# Patient Record
Sex: FEMALE | HISPANIC OR LATINO | Employment: UNEMPLOYED | URBAN - METROPOLITAN AREA
[De-identification: names, ages, dates, MRNs, and addresses within clinical notes are randomized per-mention and may not be internally consistent; named-entity substitution may affect disease eponyms.]

---

## 2021-01-28 ENCOUNTER — HOSPITAL ENCOUNTER (OUTPATIENT)
Dept: RADIOLOGY | Facility: MEDICAL CENTER | Age: 56
End: 2021-01-28
Attending: NURSE PRACTITIONER

## 2021-01-28 DIAGNOSIS — R74.8 ELEVATED LIVER ENZYMES: ICD-10-CM

## 2021-02-28 ENCOUNTER — APPOINTMENT (OUTPATIENT)
Dept: RADIOLOGY | Facility: MEDICAL CENTER | Age: 56
DRG: 417 | End: 2021-02-28
Attending: EMERGENCY MEDICINE
Payer: COMMERCIAL

## 2021-02-28 ENCOUNTER — HOSPITAL ENCOUNTER (INPATIENT)
Facility: MEDICAL CENTER | Age: 56
LOS: 4 days | DRG: 417 | End: 2021-03-04
Attending: EMERGENCY MEDICINE | Admitting: INTERNAL MEDICINE
Payer: COMMERCIAL

## 2021-02-28 DIAGNOSIS — K85.10 ACUTE GALLSTONE PANCREATITIS: ICD-10-CM

## 2021-02-28 DIAGNOSIS — K80.43: ICD-10-CM

## 2021-02-28 DIAGNOSIS — K81.9 CHOLECYSTITIS: ICD-10-CM

## 2021-02-28 DIAGNOSIS — K85.90 ACUTE PANCREATITIS, UNSPECIFIED COMPLICATION STATUS, UNSPECIFIED PANCREATITIS TYPE: ICD-10-CM

## 2021-02-28 PROBLEM — R10.9 AP (ABDOMINAL PAIN): Status: ACTIVE | Noted: 2021-02-28

## 2021-02-28 LAB
ALBUMIN SERPL BCP-MCNC: 4.1 G/DL (ref 3.2–4.9)
ALBUMIN/GLOB SERPL: 1 G/DL
ALP SERPL-CCNC: 339 U/L (ref 30–99)
ALT SERPL-CCNC: 214 U/L (ref 2–50)
ANION GAP SERPL CALC-SCNC: 14 MMOL/L (ref 7–16)
APPEARANCE UR: CLEAR
AST SERPL-CCNC: 418 U/L (ref 12–45)
BASOPHILS # BLD AUTO: 0.5 % (ref 0–1.8)
BASOPHILS # BLD: 0.03 K/UL (ref 0–0.12)
BILIRUB SERPL-MCNC: 2.3 MG/DL (ref 0.1–1.5)
BILIRUB UR QL STRIP.AUTO: NEGATIVE
BUN SERPL-MCNC: 11 MG/DL (ref 8–22)
CALCIUM SERPL-MCNC: 9.9 MG/DL (ref 8.5–10.5)
CHLORIDE SERPL-SCNC: 104 MMOL/L (ref 96–112)
CO2 SERPL-SCNC: 20 MMOL/L (ref 20–33)
COLOR UR: YELLOW
CREAT SERPL-MCNC: 0.46 MG/DL (ref 0.5–1.4)
EOSINOPHIL # BLD AUTO: 0.02 K/UL (ref 0–0.51)
EOSINOPHIL NFR BLD: 0.3 % (ref 0–6.9)
ERYTHROCYTE [DISTWIDTH] IN BLOOD BY AUTOMATED COUNT: 48.2 FL (ref 35.9–50)
GLOBULIN SER CALC-MCNC: 4.1 G/DL (ref 1.9–3.5)
GLUCOSE SERPL-MCNC: 99 MG/DL (ref 65–99)
GLUCOSE UR STRIP.AUTO-MCNC: NEGATIVE MG/DL
HCG SERPL QL: NEGATIVE
HCT VFR BLD AUTO: 43.6 % (ref 37–47)
HGB BLD-MCNC: 13.9 G/DL (ref 12–16)
IMM GRANULOCYTES # BLD AUTO: 0.01 K/UL (ref 0–0.11)
IMM GRANULOCYTES NFR BLD AUTO: 0.2 % (ref 0–0.9)
KETONES UR STRIP.AUTO-MCNC: ABNORMAL MG/DL
LEUKOCYTE ESTERASE UR QL STRIP.AUTO: NEGATIVE
LIPASE SERPL-CCNC: 399 U/L (ref 11–82)
LYMPHOCYTES # BLD AUTO: 1.64 K/UL (ref 1–4.8)
LYMPHOCYTES NFR BLD: 27.7 % (ref 22–41)
MCH RBC QN AUTO: 29.4 PG (ref 27–33)
MCHC RBC AUTO-ENTMCNC: 31.9 G/DL (ref 33.6–35)
MCV RBC AUTO: 92.4 FL (ref 81.4–97.8)
MICRO URNS: ABNORMAL
MONOCYTES # BLD AUTO: 0.36 K/UL (ref 0–0.85)
MONOCYTES NFR BLD AUTO: 6.1 % (ref 0–13.4)
NEUTROPHILS # BLD AUTO: 3.86 K/UL (ref 2–7.15)
NEUTROPHILS NFR BLD: 65.2 % (ref 44–72)
NITRITE UR QL STRIP.AUTO: NEGATIVE
NRBC # BLD AUTO: 0 K/UL
NRBC BLD-RTO: 0 /100 WBC
PH UR STRIP.AUTO: 6 [PH] (ref 5–8)
PLATELET # BLD AUTO: 218 K/UL (ref 164–446)
PMV BLD AUTO: 9.4 FL (ref 9–12.9)
POTASSIUM SERPL-SCNC: 4.3 MMOL/L (ref 3.6–5.5)
PROT SERPL-MCNC: 8.2 G/DL (ref 6–8.2)
PROT UR QL STRIP: NEGATIVE MG/DL
RBC # BLD AUTO: 4.72 M/UL (ref 4.2–5.4)
RBC UR QL AUTO: NEGATIVE
SARS-COV+SARS-COV-2 AG RESP QL IA.RAPID: NOTDETECTED
SODIUM SERPL-SCNC: 138 MMOL/L (ref 135–145)
SP GR UR STRIP.AUTO: 1.01
SPECIMEN SOURCE: NORMAL
UROBILINOGEN UR STRIP.AUTO-MCNC: 0.2 MG/DL
WBC # BLD AUTO: 5.9 K/UL (ref 4.8–10.8)

## 2021-02-28 PROCEDURE — 85025 COMPLETE CBC W/AUTO DIFF WBC: CPT

## 2021-02-28 PROCEDURE — 700101 HCHG RX REV CODE 250: Performed by: EMERGENCY MEDICINE

## 2021-02-28 PROCEDURE — 700102 HCHG RX REV CODE 250 W/ 637 OVERRIDE(OP): Performed by: EMERGENCY MEDICINE

## 2021-02-28 PROCEDURE — 83690 ASSAY OF LIPASE: CPT

## 2021-02-28 PROCEDURE — 76705 ECHO EXAM OF ABDOMEN: CPT

## 2021-02-28 PROCEDURE — 96365 THER/PROPH/DIAG IV INF INIT: CPT

## 2021-02-28 PROCEDURE — 96368 THER/DIAG CONCURRENT INF: CPT

## 2021-02-28 PROCEDURE — 700105 HCHG RX REV CODE 258: Performed by: EMERGENCY MEDICINE

## 2021-02-28 PROCEDURE — 80053 COMPREHEN METABOLIC PANEL: CPT

## 2021-02-28 PROCEDURE — 81003 URINALYSIS AUTO W/O SCOPE: CPT

## 2021-02-28 PROCEDURE — 700105 HCHG RX REV CODE 258: Performed by: INTERNAL MEDICINE

## 2021-02-28 PROCEDURE — 87426 SARSCOV CORONAVIRUS AG IA: CPT

## 2021-02-28 PROCEDURE — A9270 NON-COVERED ITEM OR SERVICE: HCPCS | Performed by: EMERGENCY MEDICINE

## 2021-02-28 PROCEDURE — 770006 HCHG ROOM/CARE - MED/SURG/GYN SEMI*

## 2021-02-28 PROCEDURE — C9803 HOPD COVID-19 SPEC COLLECT: HCPCS | Performed by: EMERGENCY MEDICINE

## 2021-02-28 PROCEDURE — 700111 HCHG RX REV CODE 636 W/ 250 OVERRIDE (IP): Performed by: EMERGENCY MEDICINE

## 2021-02-28 PROCEDURE — 36415 COLL VENOUS BLD VENIPUNCTURE: CPT

## 2021-02-28 PROCEDURE — 99222 1ST HOSP IP/OBS MODERATE 55: CPT | Performed by: INTERNAL MEDICINE

## 2021-02-28 PROCEDURE — 99285 EMERGENCY DEPT VISIT HI MDM: CPT

## 2021-02-28 PROCEDURE — 84703 CHORIONIC GONADOTROPIN ASSAY: CPT

## 2021-02-28 RX ORDER — HEPARIN SODIUM 5000 [USP'U]/ML
5000 INJECTION, SOLUTION INTRAVENOUS; SUBCUTANEOUS EVERY 8 HOURS
Status: DISCONTINUED | OUTPATIENT
Start: 2021-02-28 | End: 2021-03-04 | Stop reason: HOSPADM

## 2021-02-28 RX ORDER — AMOXICILLIN 250 MG
2 CAPSULE ORAL 2 TIMES DAILY
Status: DISCONTINUED | OUTPATIENT
Start: 2021-02-28 | End: 2021-03-04

## 2021-02-28 RX ORDER — HYDROCODONE BITARTRATE AND ACETAMINOPHEN 5; 325 MG/1; MG/1
1 TABLET ORAL ONCE
Status: COMPLETED | OUTPATIENT
Start: 2021-02-28 | End: 2021-02-28

## 2021-02-28 RX ORDER — SODIUM CHLORIDE 9 MG/ML
INJECTION, SOLUTION INTRAVENOUS CONTINUOUS
Status: DISCONTINUED | OUTPATIENT
Start: 2021-02-28 | End: 2021-03-04 | Stop reason: HOSPADM

## 2021-02-28 RX ORDER — OXYCODONE HYDROCHLORIDE 5 MG/1
2.5 TABLET ORAL
Status: DISCONTINUED | OUTPATIENT
Start: 2021-02-28 | End: 2021-03-04

## 2021-02-28 RX ORDER — LABETALOL HYDROCHLORIDE 5 MG/ML
10 INJECTION, SOLUTION INTRAVENOUS EVERY 4 HOURS PRN
Status: DISCONTINUED | OUTPATIENT
Start: 2021-02-28 | End: 2021-03-04 | Stop reason: HOSPADM

## 2021-02-28 RX ORDER — ONDANSETRON 4 MG/1
4 TABLET, ORALLY DISINTEGRATING ORAL ONCE
Status: COMPLETED | OUTPATIENT
Start: 2021-02-28 | End: 2021-02-28

## 2021-02-28 RX ORDER — ACETAMINOPHEN 325 MG/1
650 TABLET ORAL EVERY 6 HOURS PRN
Status: DISCONTINUED | OUTPATIENT
Start: 2021-02-28 | End: 2021-03-04 | Stop reason: HOSPADM

## 2021-02-28 RX ORDER — OXYCODONE HYDROCHLORIDE 5 MG/1
5 TABLET ORAL
Status: DISCONTINUED | OUTPATIENT
Start: 2021-02-28 | End: 2021-03-04

## 2021-02-28 RX ORDER — HYDROMORPHONE HYDROCHLORIDE 1 MG/ML
0.25 INJECTION, SOLUTION INTRAMUSCULAR; INTRAVENOUS; SUBCUTANEOUS
Status: DISCONTINUED | OUTPATIENT
Start: 2021-02-28 | End: 2021-03-04

## 2021-02-28 RX ORDER — POLYETHYLENE GLYCOL 3350 17 G/17G
1 POWDER, FOR SOLUTION ORAL
Status: DISCONTINUED | OUTPATIENT
Start: 2021-02-28 | End: 2021-03-04

## 2021-02-28 RX ORDER — TRAZODONE HYDROCHLORIDE 50 MG/1
100 TABLET ORAL
Status: DISCONTINUED | OUTPATIENT
Start: 2021-02-28 | End: 2021-02-28

## 2021-02-28 RX ORDER — BISACODYL 10 MG
10 SUPPOSITORY, RECTAL RECTAL
Status: DISCONTINUED | OUTPATIENT
Start: 2021-02-28 | End: 2021-03-04

## 2021-02-28 RX ADMIN — ONDANSETRON 4 MG: 4 TABLET, ORALLY DISINTEGRATING ORAL at 17:58

## 2021-02-28 RX ADMIN — HYDROCODONE BITARTRATE AND ACETAMINOPHEN 1 TABLET: 5; 325 TABLET ORAL at 17:58

## 2021-02-28 RX ADMIN — SODIUM CHLORIDE: 9 INJECTION, SOLUTION INTRAVENOUS at 21:27

## 2021-02-28 RX ADMIN — CEFTRIAXONE SODIUM 1 G: 1 INJECTION, POWDER, FOR SOLUTION INTRAMUSCULAR; INTRAVENOUS at 19:45

## 2021-02-28 RX ADMIN — METRONIDAZOLE 500 MG: 500 INJECTION, SOLUTION INTRAVENOUS at 19:45

## 2021-02-28 ASSESSMENT — COGNITIVE AND FUNCTIONAL STATUS - GENERAL
SUGGESTED CMS G CODE MODIFIER DAILY ACTIVITY: CH
DAILY ACTIVITIY SCORE: 24
MOBILITY SCORE: 24
SUGGESTED CMS G CODE MODIFIER MOBILITY: CH

## 2021-02-28 ASSESSMENT — ENCOUNTER SYMPTOMS
HEADACHES: 0
VOMITING: 0
BLURRED VISION: 0
PALPITATIONS: 0
DOUBLE VISION: 0
HEARTBURN: 1
HEMOPTYSIS: 0
INSOMNIA: 0
COUGH: 0
FEVER: 0
STRIDOR: 0
NAUSEA: 1
BRUISES/BLEEDS EASILY: 0
MYALGIAS: 0
WEIGHT LOSS: 0
ABDOMINAL PAIN: 1
NECK PAIN: 0
SORE THROAT: 0
DEPRESSION: 0
DIZZINESS: 0

## 2021-02-28 ASSESSMENT — FIBROSIS 4 INDEX
FIB4 SCORE: 7.47
FIB4 SCORE: 7.47

## 2021-02-28 ASSESSMENT — PATIENT HEALTH QUESTIONNAIRE - PHQ9
1. LITTLE INTEREST OR PLEASURE IN DOING THINGS: NOT AT ALL
2. FEELING DOWN, DEPRESSED, IRRITABLE, OR HOPELESS: NOT AT ALL
SUM OF ALL RESPONSES TO PHQ9 QUESTIONS 1 AND 2: 0

## 2021-02-28 ASSESSMENT — LIFESTYLE VARIABLES
ALCOHOL_USE: NO
TOTAL SCORE: 0
ON A TYPICAL DAY WHEN YOU DRINK ALCOHOL HOW MANY DRINKS DO YOU HAVE: 0
EVER FELT BAD OR GUILTY ABOUT YOUR DRINKING: NO
HOW MANY TIMES IN THE PAST YEAR HAVE YOU HAD 5 OR MORE DRINKS IN A DAY: 0
EVER HAD A DRINK FIRST THING IN THE MORNING TO STEADY YOUR NERVES TO GET RID OF A HANGOVER: NO
CONSUMPTION TOTAL: NEGATIVE
TOTAL SCORE: 0
AVERAGE NUMBER OF DAYS PER WEEK YOU HAVE A DRINK CONTAINING ALCOHOL: 0
HAVE PEOPLE ANNOYED YOU BY CRITICIZING YOUR DRINKING: NO
DOES PATIENT WANT TO STOP DRINKING: NO
HAVE YOU EVER FELT YOU SHOULD CUT DOWN ON YOUR DRINKING: NO
TOTAL SCORE: 0

## 2021-03-01 ENCOUNTER — APPOINTMENT (OUTPATIENT)
Dept: RADIOLOGY | Facility: MEDICAL CENTER | Age: 56
DRG: 417 | End: 2021-03-01
Attending: INTERNAL MEDICINE
Payer: COMMERCIAL

## 2021-03-01 PROBLEM — K80.50 CHOLEDOCHOLITHIASIS: Status: ACTIVE | Noted: 2021-02-28

## 2021-03-01 PROBLEM — R74.01 TRANSAMINITIS: Status: ACTIVE | Noted: 2021-03-01

## 2021-03-01 PROBLEM — E87.6 HYPOKALEMIA: Status: ACTIVE | Noted: 2021-03-01

## 2021-03-01 PROBLEM — E66.01 CLASS 3 SEVERE OBESITY DUE TO EXCESS CALORIES WITH SERIOUS COMORBIDITY AND BODY MASS INDEX (BMI) OF 40.0 TO 44.9 IN ADULT (HCC): Status: ACTIVE | Noted: 2021-03-01

## 2021-03-01 PROBLEM — K85.10 ACUTE BILIARY PANCREATITIS WITHOUT INFECTION OR NECROSIS: Status: ACTIVE | Noted: 2021-02-28

## 2021-03-01 LAB
ALBUMIN SERPL BCP-MCNC: 3.3 G/DL (ref 3.2–4.9)
ALBUMIN/GLOB SERPL: 1 G/DL
ALP SERPL-CCNC: 342 U/L (ref 30–99)
ALT SERPL-CCNC: 200 U/L (ref 2–50)
ANION GAP SERPL CALC-SCNC: 11 MMOL/L (ref 7–16)
AST SERPL-CCNC: 290 U/L (ref 12–45)
BASOPHILS # BLD AUTO: 0.7 % (ref 0–1.8)
BASOPHILS # BLD: 0.03 K/UL (ref 0–0.12)
BILIRUB SERPL-MCNC: 2.7 MG/DL (ref 0.1–1.5)
BUN SERPL-MCNC: 9 MG/DL (ref 8–22)
CALCIUM SERPL-MCNC: 8.9 MG/DL (ref 8.5–10.5)
CHLORIDE SERPL-SCNC: 109 MMOL/L (ref 96–112)
CO2 SERPL-SCNC: 20 MMOL/L (ref 20–33)
CREAT SERPL-MCNC: 0.45 MG/DL (ref 0.5–1.4)
EOSINOPHIL # BLD AUTO: 0.06 K/UL (ref 0–0.51)
EOSINOPHIL NFR BLD: 1.3 % (ref 0–6.9)
ERYTHROCYTE [DISTWIDTH] IN BLOOD BY AUTOMATED COUNT: 49 FL (ref 35.9–50)
GLOBULIN SER CALC-MCNC: 3.4 G/DL (ref 1.9–3.5)
GLUCOSE SERPL-MCNC: 79 MG/DL (ref 65–99)
HCT VFR BLD AUTO: 42 % (ref 37–47)
HGB BLD-MCNC: 12.9 G/DL (ref 12–16)
IMM GRANULOCYTES # BLD AUTO: 0.01 K/UL (ref 0–0.11)
IMM GRANULOCYTES NFR BLD AUTO: 0.2 % (ref 0–0.9)
LIPASE SERPL-CCNC: 56 U/L (ref 11–82)
LYMPHOCYTES # BLD AUTO: 1.86 K/UL (ref 1–4.8)
LYMPHOCYTES NFR BLD: 41.3 % (ref 22–41)
MCH RBC QN AUTO: 28.9 PG (ref 27–33)
MCHC RBC AUTO-ENTMCNC: 30.7 G/DL (ref 33.6–35)
MCV RBC AUTO: 94 FL (ref 81.4–97.8)
MONOCYTES # BLD AUTO: 0.32 K/UL (ref 0–0.85)
MONOCYTES NFR BLD AUTO: 7.1 % (ref 0–13.4)
NEUTROPHILS # BLD AUTO: 2.22 K/UL (ref 2–7.15)
NEUTROPHILS NFR BLD: 49.4 % (ref 44–72)
NRBC # BLD AUTO: 0 K/UL
NRBC BLD-RTO: 0 /100 WBC
PLATELET # BLD AUTO: 205 K/UL (ref 164–446)
PMV BLD AUTO: 9.9 FL (ref 9–12.9)
POTASSIUM SERPL-SCNC: 3.5 MMOL/L (ref 3.6–5.5)
PROT SERPL-MCNC: 6.7 G/DL (ref 6–8.2)
RBC # BLD AUTO: 4.47 M/UL (ref 4.2–5.4)
SODIUM SERPL-SCNC: 140 MMOL/L (ref 135–145)
WBC # BLD AUTO: 4.5 K/UL (ref 4.8–10.8)

## 2021-03-01 PROCEDURE — 700105 HCHG RX REV CODE 258: Performed by: NURSE PRACTITIONER

## 2021-03-01 PROCEDURE — 700105 HCHG RX REV CODE 258: Performed by: INTERNAL MEDICINE

## 2021-03-01 PROCEDURE — 700111 HCHG RX REV CODE 636 W/ 250 OVERRIDE (IP): Performed by: INTERNAL MEDICINE

## 2021-03-01 PROCEDURE — 85025 COMPLETE CBC W/AUTO DIFF WBC: CPT

## 2021-03-01 PROCEDURE — 83690 ASSAY OF LIPASE: CPT

## 2021-03-01 PROCEDURE — 74181 MRI ABDOMEN W/O CONTRAST: CPT

## 2021-03-01 PROCEDURE — 99232 SBSQ HOSP IP/OBS MODERATE 35: CPT | Performed by: INTERNAL MEDICINE

## 2021-03-01 PROCEDURE — 94760 N-INVAS EAR/PLS OXIMETRY 1: CPT

## 2021-03-01 PROCEDURE — 80053 COMPREHEN METABOLIC PANEL: CPT

## 2021-03-01 PROCEDURE — 700101 HCHG RX REV CODE 250: Performed by: INTERNAL MEDICINE

## 2021-03-01 PROCEDURE — 770006 HCHG ROOM/CARE - MED/SURG/GYN SEMI*

## 2021-03-01 PROCEDURE — 700102 HCHG RX REV CODE 250 W/ 637 OVERRIDE(OP): Performed by: INTERNAL MEDICINE

## 2021-03-01 PROCEDURE — A9270 NON-COVERED ITEM OR SERVICE: HCPCS | Performed by: INTERNAL MEDICINE

## 2021-03-01 PROCEDURE — 93005 ELECTROCARDIOGRAM TRACING: CPT | Performed by: INTERNAL MEDICINE

## 2021-03-01 PROCEDURE — 36415 COLL VENOUS BLD VENIPUNCTURE: CPT

## 2021-03-01 RX ADMIN — SODIUM CHLORIDE: 9 INJECTION, SOLUTION INTRAVENOUS at 02:05

## 2021-03-01 RX ADMIN — SODIUM CHLORIDE: 9 INJECTION, SOLUTION INTRAVENOUS at 15:44

## 2021-03-01 RX ADMIN — ACETAMINOPHEN 650 MG: 325 TABLET, FILM COATED ORAL at 21:17

## 2021-03-01 RX ADMIN — SODIUM CHLORIDE: 9 INJECTION, SOLUTION INTRAVENOUS at 09:28

## 2021-03-01 RX ADMIN — METRONIDAZOLE 500 MG: 500 INJECTION, SOLUTION INTRAVENOUS at 03:31

## 2021-03-01 RX ADMIN — CEFTRIAXONE SODIUM 1 G: 1 INJECTION, POWDER, FOR SOLUTION INTRAMUSCULAR; INTRAVENOUS at 21:15

## 2021-03-01 RX ADMIN — METRONIDAZOLE 500 MG: 500 INJECTION, SOLUTION INTRAVENOUS at 21:47

## 2021-03-01 RX ADMIN — METRONIDAZOLE 500 MG: 500 INJECTION, SOLUTION INTRAVENOUS at 15:42

## 2021-03-01 RX ADMIN — OXYCODONE 5 MG: 5 TABLET ORAL at 22:43

## 2021-03-01 ASSESSMENT — ENCOUNTER SYMPTOMS
RESPIRATORY NEGATIVE: 1
LOSS OF CONSCIOUSNESS: 0
HEADACHES: 0
PALPITATIONS: 0
NEUROLOGICAL NEGATIVE: 1
WEAKNESS: 0
PSYCHIATRIC NEGATIVE: 1
ORTHOPNEA: 0
WHEEZING: 0
FALLS: 0
MUSCULOSKELETAL NEGATIVE: 1
SEIZURES: 0
DEPRESSION: 0
CARDIOVASCULAR NEGATIVE: 1
EYES NEGATIVE: 1
EYE PAIN: 0
CHILLS: 0
FOCAL WEAKNESS: 0
NAUSEA: 0
HALLUCINATIONS: 0
ABDOMINAL PAIN: 1
NERVOUS/ANXIOUS: 0
STRIDOR: 0
EYE DISCHARGE: 0
BLOOD IN STOOL: 0
MEMORY LOSS: 0
DIZZINESS: 0
INSOMNIA: 0
NAUSEA: 1
SENSORY CHANGE: 0
FEVER: 0
CONSTITUTIONAL NEGATIVE: 1
SINUS PAIN: 0
VOMITING: 0
SHORTNESS OF BREATH: 0
SPUTUM PRODUCTION: 0
DIARRHEA: 0
POLYDIPSIA: 0
SPEECH CHANGE: 0
COUGH: 0
WEIGHT LOSS: 0
HEMOPTYSIS: 0
VOMITING: 1
CONSTIPATION: 0
SORE THROAT: 0
BRUISES/BLEEDS EASILY: 0
FLANK PAIN: 0

## 2021-03-01 ASSESSMENT — PAIN DESCRIPTION - PAIN TYPE
TYPE: ACUTE PAIN

## 2021-03-01 ASSESSMENT — LIFESTYLE VARIABLES: SUBSTANCE_ABUSE: 0

## 2021-03-01 NOTE — PROGRESS NOTES
Assumed care of patient at 0700. Patient is alert and oriented, respirations are unlabored and regular, patient sitting on edge of bed at this time. Patient bradycardic in the low 40s, MD aware, patient asymptomatic. Lung sounds clear throughout, on room air. Abdomen soft, tender to RUQ upon palpation, patient denies pain sitting on edge of bed, +bowel sounds. Voiding without difficulty. Skin intact. Bed in lowest position, call light within reach, patient states no needs at this time.

## 2021-03-01 NOTE — PROGRESS NOTES
0315: This RN was rounding on pt and saw her HR was 38. This RN woke the patient. Pt claims to be asymptomatic. Pt is baseline pushpa in mid-low 50's     0320: Hospitalist paged per orders   0345: No response, second page sent     0350: Hospitalist updated, no new orders at this time   Will continue to monitor hemodynamic stability

## 2021-03-01 NOTE — ASSESSMENT & PLAN NOTE
-Secondary to acute gallstone pancreatitis.  -Continue pain control as required.  -Interventions as above.

## 2021-03-01 NOTE — PROGRESS NOTES
Hospital Medicine Daily Progress Note    Date of Service  3/1/2021    Chief Complaint  Right upper quadrant abdominal pain, nausea    Hospital Course  Ms. Quintero is a 55-year-old [place]e-speaking female who presented to the emergency department on 2/28/2021 with right upper quadrant abdominal pain and nausea.  She had a recent ultrasound done on 1/28/2021 that showed gallstones.  She presented to the emergency department where labs showed elevated LFTs and pancreatitis.  Her bilirubin at that time was 2.3.  A repeat ultrasound was done and showed cholelithiasis and gallbladder sludge with mild biliary dilatation.  Surgery was consulted and recommended an MRCP which was done and showed a 5 mm stone present within the common bile duct and biliary dilatation.  GI was subsequently consulted for ERCP with the plan of the patient undergoing laparoscopic cholecystectomy once the stone was removed and the pancreatitis is improving.    Interval Problem Update  Still having 10/10 pain in the epigastric area of her abdomen.    Negative De La Garza sign even with deep palpation.    No nausea or vomiting overnight.  Dr. Clemens consulted for ERCP.  Bradycardic overnight, EKG unremarkable.    Mild improvement in her LFTs, bilirubin now 2.7.  Updated lipase level is normal at 56.    Afebrile overnight, HR 30s-50s, SBP 90s-100s, O2 saturations within normal limits on room air.    Consultants/Specialty  Gastroenterology  General surgery    Code Status  Full Code    Disposition  Clinical for now.  Home when medically clear.    Review of Systems  Review of Systems   Constitutional: Negative for fever and malaise/fatigue.   Respiratory: Negative for cough, shortness of breath and wheezing.    Cardiovascular: Negative for chest pain, palpitations and leg swelling.   Gastrointestinal: Positive for abdominal pain. Negative for constipation, diarrhea, nausea and vomiting.   Genitourinary: Negative for dysuria, frequency and urgency.    Neurological: Negative for dizziness, sensory change, speech change, focal weakness, weakness and headaches.   Endo/Heme/Allergies: Does not bruise/bleed easily.   Psychiatric/Behavioral: Negative for depression. The patient is not nervous/anxious and does not have insomnia.    All other systems reviewed and are negative.     Physical Exam  Temp:  [36.3 °C (97.4 °F)-36.7 °C (98.1 °F)] 36.7 °C (98.1 °F)  Pulse:  [38-64] 58  Resp:  [16-17] 17  BP: ()/(48-63) 100/48  SpO2:  [94 %-99 %] 96 %    Physical Exam  Vitals and nursing note reviewed.   Constitutional:       General: She is awake. She is not in acute distress.     Appearance: Normal appearance. She is well-developed. She is morbidly obese. She is not ill-appearing.   HENT:      Head: Normocephalic and atraumatic.      Mouth/Throat:      Lips: Pink.      Mouth: Mucous membranes are moist.   Eyes:      Conjunctiva/sclera: Conjunctivae normal.      Pupils: Pupils are equal, round, and reactive to light.   Cardiovascular:      Rate and Rhythm: Regular rhythm. Bradycardia present.      Pulses: Normal pulses.      Heart sounds: Normal heart sounds.   Pulmonary:      Effort: Pulmonary effort is normal.      Breath sounds: Normal breath sounds.   Abdominal:      General: Bowel sounds are decreased. There is no distension or abdominal bruit.      Palpations: Abdomen is soft.      Tenderness: There is no abdominal tenderness. There is no guarding or rebound. Negative signs include De La Garza's sign.   Musculoskeletal:      Cervical back: Normal range of motion and neck supple.      Right lower leg: No edema.      Left lower leg: No edema.   Skin:     General: Skin is warm and dry.   Neurological:      General: No focal deficit present.      Mental Status: She is alert and oriented to person, place, and time.      GCS: GCS eye subscore is 4. GCS verbal subscore is 5. GCS motor subscore is 6.   Psychiatric:         Attention and Perception: Attention and perception  normal.         Mood and Affect: Mood and affect normal.         Speech: Speech normal.         Behavior: Behavior normal. Behavior is cooperative.         Thought Content: Thought content normal.         Cognition and Memory: Cognition and memory normal.         Judgment: Judgment normal.     Fluids    Intake/Output Summary (Last 24 hours) at 3/1/2021 1544  Last data filed at 3/1/2021 0800  Gross per 24 hour   Intake 1000 ml   Output 0 ml   Net 1000 ml     Laboratory  Recent Labs     02/28/21  1845 03/01/21  0625   WBC 5.9 4.5*   RBC 4.72 4.47   HEMOGLOBIN 13.9 12.9   HEMATOCRIT 43.6 42.0   MCV 92.4 94.0   MCH 29.4 28.9   MCHC 31.9* 30.7*   RDW 48.2 49.0   PLATELETCT 218 205   MPV 9.4 9.9     Recent Labs     02/28/21  1629 03/01/21  0625   SODIUM 138 140   POTASSIUM 4.3 3.5*   CHLORIDE 104 109   CO2 20 20   GLUCOSE 99 79   BUN 11 9   CREATININE 0.46* 0.45*   CALCIUM 9.9 8.9     Imaging  [id number]   Final Result      1.  Distal common bile duct stone measuring 5 mm with extrahepatic biliary dilation.   2.  Cholelithiasis and gallbladder wall thickening suggesting acute cholecystitis.      US-RUQ   Final Result      1.  There is cholelithiasis with gallbladder sludge, mild wall thickening and biliary dilatation. In the appropriate clinical setting this could represent acute cholecystitis.            Assessment/Plan  Acute gallstone pancreatitis- (present on admission)  Assessment & Plan  -Pancreatitis improving, lipase level today is normal.  -MRCP showed a 5 mm stone in the common bile duct with biliary dilatation.  GI Dr. Clemens consulted for ERCP.  -Surgery is following and is anticipating laparoscopic cholecystectomy this admission.  -Continue pain control.  -Continue IV ceftriaxone and flagyl.  -Currently n.p.o., sips with medications.  -Continue trending lab work.    AP (abdominal pain)- (present on admission)  Assessment & Plan  -Secondary to acute gallstone pancreatitis.  -Continue pain control as  required.  -Interventions as above.    Hypokalemia- (present on admission)  Assessment & Plan  -Mild, replace and recheck level tomorrow.    Class 3 severe obesity due to excess calories with serious comorbidity and body mass index (BMI) of 40.0 to 44.9 in adult (HCC)- (present on admission)  Assessment & Plan  -Could benefit from outpatient weight loss counseling which can be arranged through her PCP after hospital discharge and when her acute issues have resolved.    Transaminitis- (present on admission)  Assessment & Plan  -Secondary to CBD stone. GI consulted for ERCP.   -Recheck CMP tomorrow a.m.     Choledocholithiasis- (present on admission)  Assessment & Plan  -See above problems.     VTE prophylaxis: Subcutaneous heparin

## 2021-03-01 NOTE — CARE PLAN
Problem: Communication  Goal: The ability to communicate needs accurately and effectively will improve  Outcome: PROGRESSING AS EXPECTED  Translators available 24/7 PRN      Problem: Safety  Goal: Will remain free from injury  Outcome: PROGRESSING AS EXPECTED  Pt educated on fall risks/precautions      Problem: Knowledge Deficit  Goal: Knowledge of disease process/condition, treatment plan, diagnostic tests, and medications will improve  Outcome: PROGRESSING AS EXPECTED  Pt educated on POC   Goal: Knowledge of the prescribed therapeutic regimen will improve  Outcome: PROGRESSING AS EXPECTED  Pt  educated on PRN medications

## 2021-03-01 NOTE — ED TRIAGE NOTES
Had abd pain 1 month ago, went to clinic and was diagnosed with gallstones. Told to follow up with surgeon if symptoms returned. Symptoms had completely resolved, then returned at 0400 today. C/o nausea and upper abd pain just left of midline that radiates to mid back with nausea, no vomiting. 3 soft stools this morning.

## 2021-03-01 NOTE — CONSULTS
Gastroenterology Initial Consult Note               Author:  GREGG Cifuentes Date & Time Created: 3/1/2021 3:08 PM       Patient ID:  Name:             Caridad Mejia    YOB: 1965  Age:                 55 y.o.  female  MRN:               [id number]      Referring Provider:  GREG Ohara      Presenting Chief Complaint:  Abdominal pain, abnormal liver enzymes      History of Present Illness::    She is a 55-year-old female patient who came into the emergency room yesterday with complaints of abdominal pain.  The patient states that the pain is located in the epigastric area felt through to her back.  It started 2 days ago and lasted about 18 hours consistently.  She did have 1 episode similar about a month ago associated with food intake, but this did improve.  Upon evaluation in the emergency room she was found to have elevated liver enzymes initially with AST 14, , alkaline phosphatase 339, bilirubin 2.3.  Lipase level 399.  Repeat enzymes today demonstrate improved lipase, improved with LFTs, and bilirubin stable at 2.7.  Right upper quadrant ultrasound performed demonstrating cholelithiasis with gallbladder sludge, mild wall thickening and biliary dilatation.  MRCP demonstrated distal common bile duct stone measuring 5 mm with extrahepatic biliary ductal dilation as well as cholelithiasis and gallbladder wall thickening suggesting acute cholecystitis.    The patient currently denies any pain, nausea, vomiting.  She denies fevers, chills, chest pain.  Covid negative.  She has never had an endoscopy or colonoscopy before.  No significant past medical history.  She is obese with BMI 42.27.  Of note, the patient denied any further aggravating, improving, or modifying factors.  Her case was discussed with her niece Elis who provided help with translation for the patient in Macedonian.      Review of Systems:  Review of Systems   Constitutional: Negative.  Negative for  chills, fever, malaise/fatigue and weight loss.   HENT: Negative.  Negative for congestion, nosebleeds, sinus pain and sore throat.    Eyes: Negative.  Negative for pain and discharge.   Respiratory: Negative.  Negative for cough, hemoptysis, sputum production, shortness of breath and stridor.    Cardiovascular: Negative.  Negative for chest pain, palpitations and orthopnea.   Gastrointestinal: Positive for abdominal pain, nausea and vomiting. Negative for blood in stool, diarrhea and melena.   Genitourinary: Negative.  Negative for flank pain, frequency and hematuria.   Musculoskeletal: Negative.  Negative for falls and joint pain.   Skin: Negative.  Negative for itching and rash.   Neurological: Negative.  Negative for speech change, focal weakness, seizures, loss of consciousness and weakness.   Endo/Heme/Allergies: Negative.  Negative for polydipsia. Does not bruise/bleed easily.   Psychiatric/Behavioral: Negative.  Negative for hallucinations, memory loss and substance abuse. The patient is not nervous/anxious.    All other systems reviewed and are negative.            Past Medical History:  History reviewed. No pertinent past medical history.  Active Hospital Problems    Diagnosis    • Transaminitis [R74.01]    • Class 3 severe obesity due to excess calories with serious comorbidity and body mass index (BMI) of 40.0 to 44.9 in adult (HCC) [E66.01, Z68.41]    • Hypokalemia [E87.6]    • AP (abdominal pain) [R10.9]    • Choledocholithiasis [K80.50]    • Acute biliary pancreatitis without infection or necrosis [K85.10]          Past Surgical History:  History reviewed. No pertinent surgical history.      Hospital Medications:  Current Facility-Administered Medications   Medication Dose Frequency Provider Last Rate Last Admin   • [START ON 3/2/2021] influenza vaccine quad injection 0.5 mL  0.5 mL Once Ajay Barrios M.D.       • senna-docusate (PERICOLACE or SENOKOT S) 8.6-50 MG per tablet 2 tablet  2 tablet BID  Jhonatan Randall M.D.        And   • polyethylene glycol/lytes (MIRALAX) PACKET 1 Packet  1 Packet QDAY PRN Jhonatan Randall M.D.        And   • magnesium hydroxide (MILK OF MAGNESIA) suspension 30 mL  30 mL QDAY PRN Jhonatan Randall M.D.        And   • bisacodyl (DULCOLAX) suppository 10 mg  10 mg QDAY PRN Jhonatan Randall M.D.       • NS infusion   Continuous Jhonatan Randall M.D. 200 mL/hr at 03/01/21 0928 New Bag at 03/01/21 0928   • acetaminophen (Tylenol) tablet 650 mg  650 mg Q6HRS PRN Jhonatan Randall M.D.       • Pharmacy Consult Request ...Pain Management Review 1 Each  1 Each PHARMACY TO DOSE Jhonatan Randall M.D.       • oxyCODONE immediate-release (ROXICODONE) tablet 2.5 mg  2.5 mg Q3HRS PRN Jhonatan Randall M.D.        Or   • oxyCODONE immediate-release (ROXICODONE) tablet 5 mg  5 mg Q3HRS PRN Jhonatan Randall M.D.        Or   • HYDROmorphone pf (DILAUDID) injection 0.25 mg  0.25 mg Q3HRS PRN Jhonatan Randall M.D.       • labetalol (NORMODYNE/TRANDATE) injection 10 mg  10 mg Q4HRS PRKEIKO Randall M.D.       • heparin injection 5,000 Units  5,000 Units Q8HRS Jhonatan Randall M.D.       • cefTRIAXone (ROCEPHIN) 1 g in  mL IVPB  1 g Q24HRS Jhonatan Randall M.D.       • metroNIDAZOLE (FLAGYL) IVPB 500 mg  500 mg Q8HRS Jhonatan Randall M.D.   Stopped at 03/01/21 0431   Last reviewed on 2/28/2021  6:44 PM by Morris Gil        Current Outpatient Medications:  Medications Prior to Admission   Medication Sig Dispense Refill Last Dose   • ibuprofen (MOTRIN) 800 MG TABS Take 1 Tab by mouth every 8 hours as needed (pain). (Patient not taking: Reported on 2/28/2021) 20 Each 0 Not Taking at Unknown time   • hydrocodone-acetaminophen (NORCO) 5-325 MG TABS per tablet Take 1 Tab by mouth every four hours as needed. (Patient not taking: Reported on 2/28/2021) 15 Tab 0 Not Taking at Unknown time         Medication Allergies:  No Known Allergies      Family Medical History:  Family History   Problem Relation Age of  "Onset   • Hypertension Father          Social History:  Social History     Socioeconomic History   • Marital status:      Spouse name: Not on file   • Number of children: Not on file   • Years of education: Not on file   • Highest education level: Not on file   Occupational History   • Not on file   Tobacco Use   • Smoking status: Never Smoker   • Smokeless tobacco: Never Used   Substance and Sexual Activity   • Alcohol use: No   • Drug use: No   • Sexual activity: Not on file   Other Topics Concern   • Not on file   Social History Narrative   • Not on file     Social Determinants of Health     Financial Resource Strain:    • Difficulty of Paying Living Expenses:    Food Insecurity:    • Worried About Running Out of Food in the Last Year:    • Ran Out of Food in the Last Year:    Transportation Needs:    • Lack of Transportation (Medical):    • Lack of Transportation (Non-Medical):    Physical Activity:    • Days of Exercise per Week:    • Minutes of Exercise per Session:    Stress:    • Feeling of Stress :    Social Connections:    • Frequency of Communication with Friends and Family:    • Frequency of Social Gatherings with Friends and Family:    • Attends [employer] Services:    • Active Member of Clubs or Organizations:    • Attends Club or Organization Meetings:    • Marital Status:    Intimate Partner Violence:    • Fear of Current or Ex-Partner:    • Emotionally Abused:    • Physically Abused:    • Sexually Abused:          Vital signs:  Weight/BMI: Body mass index is 42.27 kg/m².  /48   Pulse (!) 58   Temp 36.7 °C (98.1 °F) (Temporal)   Resp 17   Ht 1.5 m (4' 11.06\")   Wt 95.1 kg (209 lb 10.5 oz)   SpO2 96%   Vitals:    03/01/21 0523 03/01/21 0528 03/01/21 0800 03/01/21 1050   BP:   100/48    Pulse: (!) 41 64 (!) 51 (!) 58   Resp:   16 17   Temp:   36.7 °C (98.1 °F)    TempSrc:   Temporal    SpO2:   99% 96%   Weight:       Height:         Oxygen Therapy:  Pulse Oximetry: 96 %, O2 (LPM): " 0.5, O2 Delivery Device: Nasal Cannula    Intake/Output Summary (Last 24 hours) at 3/1/2021 1508  Last data filed at 3/1/2021 0800  Gross per 24 hour   Intake 1000 ml   Output 0 ml   Net 1000 ml         Physical Exam:  Physical Exam  Vitals and nursing note reviewed.   Constitutional:       Appearance: Normal appearance. She is obese.   HENT:      Head: Normocephalic and atraumatic.      Right Ear: External ear normal.      Left Ear: External ear normal.      Nose: Nose normal.      Mouth/Throat:      Mouth: Mucous membranes are moist.      Pharynx: Oropharynx is clear.   Eyes:      General: Scleral icterus present.      Pupils: Pupils are equal, round, and reactive to light.   Cardiovascular:      Rate and Rhythm: Normal rate and regular rhythm.      Pulses: Normal pulses.      Heart sounds: Normal heart sounds. No murmur.   Pulmonary:      Effort: No respiratory distress.      Breath sounds: Normal breath sounds. No wheezing, rhonchi or rales.   Abdominal:      General: Abdomen is flat. Bowel sounds are normal.      Palpations: Abdomen is soft.      Tenderness: There is no abdominal tenderness.   Musculoskeletal:         General: No swelling or tenderness.      Cervical back: Neck supple.   Lymphadenopathy:      Cervical: No cervical adenopathy.   Skin:     General: Skin is warm and dry.      Capillary Refill: Capillary refill takes less than 2 seconds.      Coloration: Skin is jaundiced and pale.   Neurological:      General: No focal deficit present.      Mental Status: She is alert and oriented to person, place, and time.   Psychiatric:         Thought Content: Thought content normal.         Judgment: Judgment normal.           Labs:  Recent Labs     02/28/21  1629 03/01/21  0625   SODIUM 138 140   POTASSIUM 4.3 3.5*   CHLORIDE 104 109   CO2 20 20   BUN 11 9   CREATININE 0.46* 0.45*   CALCIUM 9.9 8.9     Recent Labs     02/28/21  1629 03/01/21  0625 03/01/21  1340   ALTSGPT 214* 200*  --    ASTSGOT 418* 290*   --    ALKPHOSPHAT 339* 342*  --    TBILIRUBIN 2.3* 2.7*  --    LIPASE 399*  --  56   GLUCOSE 99 79  --      Recent Labs     02/28/21  1629 02/28/21  1845 03/01/21  0625   WBC  --  5.9 4.5*   NEUTSPOLYS  --  65.20 49.40   LYMPHOCYTES  --  27.70 41.30*   MONOCYTES  --  6.10 7.10   EOSINOPHILS  --  0.30 1.30   BASOPHILS  --  0.50 0.70   ASTSGOT 418*  --  290*   ALTSGPT 214*  --  200*   ALKPHOSPHAT 339*  --  342*   TBILIRUBIN 2.3*  --  2.7*     Recent Labs     02/28/21 1845 03/01/21  0625   RBC 4.72 4.47   HEMOGLOBIN 13.9 12.9   HEMATOCRIT 43.6 42.0   PLATELETCT 218 205     Recent Results (from the past 24 hour(s))   COMP METABOLIC PANEL    Collection Time: 02/28/21  4:29 PM   Result Value Ref Range    Sodium 138 135 - 145 mmol/L    Potassium 4.3 3.6 - 5.5 mmol/L    Chloride 104 96 - 112 mmol/L    Co2 20 20 - 33 mmol/L    Anion Gap 14.0 7.0 - 16.0    Glucose 99 65 - 99 mg/dL    Bun 11 8 - 22 mg/dL    Creatinine 0.46 (L) 0.50 - 1.40 mg/dL    Calcium 9.9 8.5 - 10.5 mg/dL    AST(SGOT) 418 (H) 12 - 45 U/L    ALT(SGPT) 214 (H) 2 - 50 U/L    Alkaline Phosphatase 339 (H) 30 - 99 U/L    Total Bilirubin 2.3 (H) 0.1 - 1.5 mg/dL    Albumin 4.1 3.2 - 4.9 g/dL    Total Protein 8.2 6.0 - 8.2 g/dL    Globulin 4.1 (H) 1.9 - 3.5 g/dL    A-G Ratio 1.0 g/dL   LIPASE    Collection Time: 02/28/21  4:29 PM   Result Value Ref Range    Lipase 399 (H) 11 - 82 U/L   URINALYSIS,CULTURE IF INDICATED    Collection Time: 02/28/21  4:29 PM    Specimen: Blood   Result Value Ref Range    Color Yellow     Character Clear     Specific Gravity 1.006 <1.035    Ph 6.0 5.0 - 8.0    Glucose Negative Negative mg/dL    Ketones Trace (A) Negative mg/dL    Protein Negative Negative mg/dL    Bilirubin Negative Negative    Urobilinogen, Urine 0.2 Negative    Nitrite Negative Negative    Leukocyte Esterase Negative Negative    Occult Blood Negative Negative    Micro Urine Req see below    HCG QUAL SERUM    Collection Time: 02/28/21  4:29 PM   Result Value Ref Range     Beta-Hcg Qualitative Serum Negative Negative   ESTIMATED GFR    Collection Time: 02/28/21  4:29 PM   Result Value Ref Range    GFR If African American >60 >60 mL/min/1.73 m 2    GFR If Non African American >60 >60 mL/min/1.73 m 2   CBC WITH DIFFERENTIAL    Collection Time: 02/28/21  6:45 PM   Result Value Ref Range    WBC 5.9 4.8 - 10.8 K/uL    RBC 4.72 4.20 - 5.40 M/uL    Hemoglobin 13.9 12.0 - 16.0 g/dL    Hematocrit 43.6 37.0 - 47.0 %    MCV 92.4 81.4 - 97.8 fL    MCH 29.4 27.0 - 33.0 pg    MCHC 31.9 (L) 33.6 - 35.0 g/dL    RDW 48.2 35.9 - 50.0 fL    Platelet Count 218 164 - 446 K/uL    MPV 9.4 9.0 - 12.9 fL    Neutrophils-Polys 65.20 44.00 - 72.00 %    Lymphocytes 27.70 22.00 - 41.00 %    Monocytes 6.10 0.00 - 13.40 %    Eosinophils 0.30 0.00 - 6.90 %    Basophils 0.50 0.00 - 1.80 %    Immature Granulocytes 0.20 0.00 - 0.90 %    Nucleated RBC 0.00 /100 WBC    Neutrophils (Absolute) 3.86 2.00 - 7.15 K/uL    Lymphs (Absolute) 1.64 1.00 - 4.80 K/uL    Monos (Absolute) 0.36 0.00 - 0.85 K/uL    Eos (Absolute) 0.02 0.00 - 0.51 K/uL    Baso (Absolute) 0.03 0.00 - 0.12 K/uL    Immature Granulocytes (abs) 0.01 0.00 - 0.11 K/uL    NRBC (Absolute) 0.00 K/uL   SARS-COV Antigen PANFILO: Collect dry nasal swab AND NP swab in VTM    Collection Time: 02/28/21  7:42 PM   Result Value Ref Range    SARS-CoV-2 Source Nasal Swab     SARS-COV ANTIGEN PANFILO NotDetected Not-Detected   CBC with Differential    Collection Time: 03/01/21  6:25 AM   Result Value Ref Range    WBC 4.5 (L) 4.8 - 10.8 K/uL    RBC 4.47 4.20 - 5.40 M/uL    Hemoglobin 12.9 12.0 - 16.0 g/dL    Hematocrit 42.0 37.0 - 47.0 %    MCV 94.0 81.4 - 97.8 fL    MCH 28.9 27.0 - 33.0 pg    MCHC 30.7 (L) 33.6 - 35.0 g/dL    RDW 49.0 35.9 - 50.0 fL    Platelet Count 205 164 - 446 K/uL    MPV 9.9 9.0 - 12.9 fL    Neutrophils-Polys 49.40 44.00 - 72.00 %    Lymphocytes 41.30 (H) 22.00 - 41.00 %    Monocytes 7.10 0.00 - 13.40 %    Eosinophils 1.30 0.00 - 6.90 %    Basophils 0.70 0.00  - 1.80 %    Immature Granulocytes 0.20 0.00 - 0.90 %    Nucleated RBC 0.00 /100 WBC    Neutrophils (Absolute) 2.22 2.00 - 7.15 K/uL    Lymphs (Absolute) 1.86 1.00 - 4.80 K/uL    Monos (Absolute) 0.32 0.00 - 0.85 K/uL    Eos (Absolute) 0.06 0.00 - 0.51 K/uL    Baso (Absolute) 0.03 0.00 - 0.12 K/uL    Immature Granulocytes (abs) 0.01 0.00 - 0.11 K/uL    NRBC (Absolute) 0.00 K/uL   Comp Metabolic Panel (CMP)    Collection Time: 21  6:25 AM   Result Value Ref Range    Sodium 140 135 - 145 mmol/L    Potassium 3.5 (L) 3.6 - 5.5 mmol/L    Chloride 109 96 - 112 mmol/L    Co2 20 20 - 33 mmol/L    Anion Gap 11.0 7.0 - 16.0    Glucose 79 65 - 99 mg/dL    Bun 9 8 - 22 mg/dL    Creatinine 0.45 (L) 0.50 - 1.40 mg/dL    Calcium 8.9 8.5 - 10.5 mg/dL    AST(SGOT) 290 (H) 12 - 45 U/L    ALT(SGPT) 200 (H) 2 - 50 U/L    Alkaline Phosphatase 342 (H) 30 - 99 U/L    Total Bilirubin 2.7 (H) 0.1 - 1.5 mg/dL    Albumin 3.3 3.2 - 4.9 g/dL    Total Protein 6.7 6.0 - 8.2 g/dL    Globulin 3.4 1.9 - 3.5 g/dL    A-G Ratio 1.0 g/dL   ESTIMATED GFR    Collection Time: 21  6:25 AM   Result Value Ref Range    GFR If African American >60 >60 mL/min/1.73 m 2    GFR If Non African American >60 >60 mL/min/1.73 m 2   EKG    Collection Time: 21 10:45 AM   Result Value Ref Range    Report       [facility]    Test Date:  2021  Pt Name:    ADONAY ENGLISHROSEMARY         Department: 141  MRN:        [id number]                      Room:       T413  Gender:     Female                       Technician: MARY  :        [id number]                   Requested By:ADRIEL CASTILLO  Order #:    [id number]                    Reading MD:    Measurements  Intervals                                Axis  Rate:       49                           P:          18  NM:         131                          QRS:        -24  QRSD:       94                           T:          37  QT:         476  QTc:        430    Interpretive Statements  SINUS  BRADYCARDIA  BORDERLINE LEFT AXIS DEVIATION  MINIMAL ST ELEVATION, ANTERIOR LEADS  No previous ECG available for comparison     LIPASE    Collection Time: 03/01/21  1:40 PM   Result Value Ref Range    Lipase 56 11 - 82 U/L         Radiology Review:  [id number]   Final Result      1.  Distal common bile duct stone measuring 5 mm with extrahepatic biliary dilation.   2.  Cholelithiasis and gallbladder wall thickening suggesting acute cholecystitis.      US-RUQ   Final Result      1.  There is cholelithiasis with gallbladder sludge, mild wall thickening and biliary dilatation. In the appropriate clinical setting this could represent acute cholecystitis.               MDM (Data Review):   -Records reviewed and summarized in current documentation  -I personally reviewed and interpreted the laboratory results  -I personally reviewed the radiology images      Medical Decision Making, by Problem:  Active Hospital Problems    Diagnosis    • Transaminitis [R74.01]    • Class 3 severe obesity due to excess calories with serious comorbidity and body mass index (BMI) of 40.0 to 44.9 in adult (HCC) [E66.01, Z68.41]    • Hypokalemia [E87.6]    • AP (abdominal pain) [R10.9]    • Choledocholithiasis [K80.50]    • Acute biliary pancreatitis without infection or necrosis [K85.10]            Assessment/Recommendations:  Assessment:  1.  Epigastric pain  2.  Elevated liver enzymes  3.  Gallstone pancreatitis  4.  Choledocholithiasis and acute cholecystitis  5.  Obesity    Plan:  1.  Endoscopic retrograde cholangiopancreatography with stone removal, biliary sphincterotomy, and possible biliary stent placement with Dr. Addi Gilmore at 12:30 PM tomorrow 3/2/2021. Patient seen and examined before proceeding.    Risks, benefits, and alternatives of aforementioned procedures were discussed with patient and her niece Elis who provided translation.  Consenting person(s) were given opportunities to ask questions and discuss other options.   Risks including but not limited to perforation, infection, bleeding, missed lesion(s), possible need for surgery(ies) and/or interventional radiology, possible need for repeat procedure(s) and/or additional testing, hospitalization possibly prolonged, cardiac and/or pulmonary event, aspiration, hypoxia, stroke, medication and/or anesthesia reaction, indefinite diagnosis, discomfort, unsuccessful and/or incomplete procedure, ineffective therapy and/or persistent symptoms, damage to adjacent organs and/or vascular structures, and other adverse events possibly life-threatening.  Interactive discussion was undertaken with Layman's terms. I answered questions in full and to satisfaction.  Consenting person(s) stated understanding and acceptance of these risks, and wished to proceed.  Informed consent was given in clear state of mind.    2.  Clear liquid diet today, n.p.o. after midnight    3.  Hold anticoagulants    4.  Aggressive IV hydration with lactated Ringer's    5.  A.m. CMP, CBC, PT/INR    6.  If patient does well after ERCP, then would likely be okay to go forward with cholecystectomy    Thank you very much for allowing me to participate in the care of your patient.  Please feel free to contact me anytime at [phone].     CARYN Cifuentes.    Core Quality Measures   Reviewed items::  Labs, Medications and Radiology reports reviewed

## 2021-03-01 NOTE — ASSESSMENT & PLAN NOTE
ERCP - biliary sphincterotomy performed with balloon common bile duct stones x10 extraction and pus, multiple yellowish 3 mm to 1cm stones removed. 3/2/2021  IV Rocephin and Flagyl

## 2021-03-01 NOTE — PROGRESS NOTES
2 RN SKIN CHECK   Skin is generally CDI  No wounds or concerning signs of skin breakdown noted   PIV in the RUE, pt turns and ambulates independently   Bony prominences intact/pink/blanching

## 2021-03-01 NOTE — H&P
Hospital Medicine History & Physical Note    Date of Service  2/28/2021    Primary Care Physician  Pcp Pt States None    Consultants  Dr. Ann general surgery    Code Status  Full Code    Chief Complaint  Chief Complaint   Patient presents with   • Abdominal Pain       History of Presenting Illness  55 y.o. female who presented 2/28/2021 with intermittent sharp 3-5 nonradiating right upper quadrant pain over the last 18 hours.  She admits to multiple previous episodes.  In the ED she is found to have LFTs concerning for biliary obstruction.  ERP consults Dr. Ann of general surgery recommending MRCP.  Patient started on Rocephin, Flagyl and referred to the hospitalist for admission.   At bedside she is comfortable but admits to multiple episodes of recurrent similar sharp right upper quadrant exacerbated by food.    Review of Systems  Review of Systems   Constitutional: Negative for fever, malaise/fatigue and weight loss.   HENT: Negative for sore throat and tinnitus.    Eyes: Negative for blurred vision and double vision.   Respiratory: Negative for cough, hemoptysis and stridor.    Cardiovascular: Negative for chest pain and palpitations.   Gastrointestinal: Positive for abdominal pain, heartburn and nausea. Negative for vomiting.   Genitourinary: Negative for dysuria and urgency.   Musculoskeletal: Negative for myalgias and neck pain.   Skin: Negative for itching and rash.   Neurological: Negative for dizziness and headaches.   Endo/Heme/Allergies: Does not bruise/bleed easily.   Psychiatric/Behavioral: Negative for depression. The patient does not have insomnia.        Past Medical History   has no past medical history on file.    Surgical History   has no past surgical history on file.     Family History  family history includes Hypertension in her father.     Social History   reports that she has never smoked. She has never used smokeless tobacco. She reports that she does not drink alcohol and does not use  drugs.    Allergies  No Known Allergies    Medications  Prior to Admission Medications   Prescriptions Last Dose Informant Patient Reported? Taking?   hydrocodone-acetaminophen (NORCO) 5-325 MG TABS per tablet Not Taking at Unknown time  No No   Sig: Take 1 Tab by mouth every four hours as needed.   Patient not taking: Reported on 2/28/2021   ibuprofen (MOTRIN) 800 MG TABS Not Taking at Unknown time  No No   Sig: Take 1 Tab by mouth every 8 hours as needed (pain).   Patient not taking: Reported on 2/28/2021      Facility-Administered Medications: None       Physical Exam  Temp:  [36.4 °C (97.6 °F)-36.7 °C (98.1 °F)] 36.7 °C (98 °F)  Pulse:  [50-57] 55  Resp:  [16-18] 16  BP: ()/(54-63) 96/55  SpO2:  [94 %-99 %] 94 %    Physical Exam  Vitals and nursing note reviewed.   Constitutional:       General: She is not in acute distress.     Appearance: Normal appearance. She is normal weight. She is not toxic-appearing.   HENT:      Head: Normocephalic and atraumatic.      Nose: Nose normal. No congestion or rhinorrhea.      Mouth/Throat:      Mouth: Mucous membranes are moist.      Pharynx: Oropharynx is clear.   Eyes:      Extraocular Movements: Extraocular movements intact.      Conjunctiva/sclera: Conjunctivae normal.      Pupils: Pupils are equal, round, and reactive to light.   Neck:      Vascular: No carotid bruit.   Cardiovascular:      Rate and Rhythm: Normal rate and regular rhythm.      Pulses: Normal pulses.      Heart sounds: Normal heart sounds. No murmur. No gallop.    Pulmonary:      Effort: No respiratory distress.      Breath sounds: Normal breath sounds. No wheezing or rales.   Abdominal:      General: Abdomen is flat. There is no distension.      Palpations: Abdomen is soft. There is no mass.      Tenderness: There is abdominal tenderness. There is no right CVA tenderness, left CVA tenderness, guarding or rebound.      Hernia: No hernia is present.   Musculoskeletal:         General: No tenderness  or signs of injury.      Cervical back: Normal range of motion and neck supple. No muscular tenderness.   Lymphadenopathy:      Cervical: No cervical adenopathy.   Skin:     Capillary Refill: Capillary refill takes less than 2 seconds.      Coloration: Skin is not jaundiced or pale.      Findings: No bruising.   Neurological:      General: No focal deficit present.      Mental Status: She is alert and oriented to person, place, and time. Mental status is at baseline.      Cranial Nerves: No cranial nerve deficit.      Motor: No weakness.      Coordination: Coordination normal.   Psychiatric:         Mood and Affect: Mood normal.         Thought Content: Thought content normal.         Judgment: Judgment normal.         Laboratory:  Recent Labs     02/28/21  1845   WBC 5.9   RBC 4.72   HEMOGLOBIN 13.9   HEMATOCRIT 43.6   MCV 92.4   MCH 29.4   MCHC 31.9*   RDW 48.2   PLATELETCT 218   MPV 9.4     Recent Labs     02/28/21  1629   SODIUM 138   POTASSIUM 4.3   CHLORIDE 104   CO2 20   GLUCOSE 99   BUN 11   CREATININE 0.46*   CALCIUM 9.9     Recent Labs     02/28/21  1629   ALTSGPT 214*   ASTSGOT 418*   ALKPHOSPHAT 339*   TBILIRUBIN 2.3*   LIPASE 399*   GLUCOSE 99         No results for input(s): NTPROBNP in the last 72 hours.      No results for input(s): TROPONINT in the last 72 hours.    Imaging:  US-RUQ   Final Result      1.  There is cholelithiasis with gallbladder sludge, mild wall thickening and biliary dilatation. In the appropriate clinical setting this could represent acute cholecystitis.         [id number]    (Results Pending)         Assessment/Plan:  I anticipate this patient will require at least two midnights for appropriate medical management, necessitating inpatient admission.    Acute pancreatitis  Assessment & Plan  Possible GSP follow up MRCP    Cholecystitis  Assessment & Plan  Ceftriaxone, Flagyl, symptomatic management  Follow-up MRCP, general surgery consult Dr. Ann  Chem-12 and CBC    AP  (abdominal pain)  Assessment & Plan  Secondary #1, symptomatic management

## 2021-03-01 NOTE — HOSPITAL COURSE
Ms. Quintero is a 55-year-old Lao-speaking female who presented to the emergency department on 2/28/2021 with right upper quadrant abdominal pain and nausea.  She had a recent ultrasound done on 1/28/2021 that showed gallstones.  She presented to the emergency department where labs showed elevated LFTs and pancreatitis.  Her bilirubin at that time was 2.3.  A repeat ultrasound was done and showed cholelithiasis and gallbladder sludge with mild biliary dilatation.  Surgery was consulted and recommended an MRCP which was done and showed a 5 mm stone present within the common bile duct and biliary dilatation.  GI was subsequently consulted for ERCP with the plan of the patient undergoing laparoscopic cholecystectomy once the stone was removed and the pancreatitis is improving.

## 2021-03-01 NOTE — CARE PLAN
Problem: Communication  Goal: The ability to communicate needs accurately and effectively will improve  Outcome: PROGRESSING AS EXPECTED   Use  services for communication, family at bedside who speaks English.    Problem: Safety  Goal: Will remain free from injury  Outcome: PROGRESSING AS EXPECTED   Call light within reach, bed in lowest position, upper 2 side rails in use, IV pole on same side of bed as bathroom, non slip  socks on.

## 2021-03-01 NOTE — ED PROVIDER NOTES
"ED Provider Note    Scribed for Alyx Cardona M.D. by Yoshi Humphries. 2/28/2021, 5:27 PM.    Primary care provider: None reported  Means of arrival: Walk-In  History obtained from: Patient using  ([id number])  History limited by: None    CHIEF COMPLAINT  Chief Complaint   Patient presents with   • Abdominal Pain     HPI  Caridad Quintero is a 55 y.o. female who presents to the Emergency Department for evaluation of acute right upper abdominal pain onset 4 AM. She woke up from sleep with right upper quadrant pain and as the day continued, her pain worsened. She had an ultrasound done on 01/28/21 which showed gallbladder stones and she is worried her pain this morning is associated with that diagnosis. The patient reports associated nausea. Negative for back pain, fever, vomiting, dysuria, or cough. She has not taken any medications to treat her symptoms. The patient has a history of cholelithiasis.   Pain does not radiate to her back.  She is not having any fevers or cough.  No known Covid exposure.    REVIEW OF SYSTEMS  Pertinent positives include right upper quadrant pain and nausea. Pertinent negatives include no back pain, fever, vomiting, dysuria, or cough. All other systems reviewed and negative.     PAST MEDICAL HISTORY   Cholelithiasis    SURGICAL HISTORY  patient denies any surgical history    SOCIAL HISTORY  Social History     Tobacco Use   • Smoking status: Never Smoker   Substance Use Topics   • Alcohol use: No   • Drug use: No      Social History     Substance and Sexual Activity   Drug Use No       FAMILY HISTORY  History reviewed. No pertinent family history.    CURRENT MEDICATIONS  Home Medications    **Home medications have not yet been reviewed for this encounter**         ALLERGIES  No Known Allergies    PHYSICAL EXAM  VITAL SIGNS: /61   Pulse (!) 57   Temp 36.7 °C (98.1 °F) (Temporal)   Resp 18   Ht 1.5 m (4' 11.06\")   SpO2 99%   BMI 25.40 kg/m²     Constitutional: Well " developed, No acute distress, Non-toxic appearance. Sitting in a chair  HENT: Normocephalic, Atraumatic, Bilateral external ears normal,  Nose normal.   Eyes: PERRL, EOMI, Conjunctiva normal.    Neck: Normal range of motion, No tenderness, Supple.     Cardiovascular: Normal heart rate, Normal rhythm.    Thorax & Lungs: Normal breath sounds, No respiratory distress.    Abdomen: minimal epigastric tenderness to palpation and mild right upper quadrant tenderness negative vance's no rebound or guarding    Skin: Warm, Dry, No erythema, No rash.   Back: No tenderness, No CVA tenderness.   Extremities: Intact distal pulses, No edema, No tenderness   Neurologic: Alert & oriented x 3, Normal motor function, Normal sensory function, No focal deficits noted.   Psychiatric: Appropriate                     DIAGNOSTIC STUDIES / PROCEDURES\    LABS  Results for orders placed or performed during the hospital encounter of 02/28/21   COMP METABOLIC PANEL   Result Value Ref Range    Sodium 138 135 - 145 mmol/L    Potassium 4.3 3.6 - 5.5 mmol/L    Chloride 104 96 - 112 mmol/L    Co2 20 20 - 33 mmol/L    Anion Gap 14.0 7.0 - 16.0    Glucose 99 65 - 99 mg/dL    Bun 11 8 - 22 mg/dL    Creatinine 0.46 (L) 0.50 - 1.40 mg/dL    Calcium 9.9 8.5 - 10.5 mg/dL    AST(SGOT) 418 (H) 12 - 45 U/L    ALT(SGPT) 214 (H) 2 - 50 U/L    Alkaline Phosphatase 339 (H) 30 - 99 U/L    Total Bilirubin 2.3 (H) 0.1 - 1.5 mg/dL    Albumin 4.1 3.2 - 4.9 g/dL    Total Protein 8.2 6.0 - 8.2 g/dL    Globulin 4.1 (H) 1.9 - 3.5 g/dL    A-G Ratio 1.0 g/dL   LIPASE   Result Value Ref Range    Lipase 399 (H) 11 - 82 U/L   URINALYSIS,CULTURE IF INDICATED    Specimen: Blood   Result Value Ref Range    Color Yellow     Character Clear     Specific Gravity 1.006 <1.035    Ph 6.0 5.0 - 8.0    Glucose Negative Negative mg/dL    Ketones Trace (A) Negative mg/dL    Protein Negative Negative mg/dL    Bilirubin Negative Negative    Urobilinogen, Urine 0.2 Negative    Nitrite  Negative Negative    Leukocyte Esterase Negative Negative    Occult Blood Negative Negative    Micro Urine Req see below    HCG QUAL SERUM   Result Value Ref Range    Beta-Hcg Qualitative Serum Negative Negative   CBC WITH DIFFERENTIAL   Result Value Ref Range    WBC 5.9 4.8 - 10.8 K/uL    RBC 4.72 4.20 - 5.40 M/uL    Hemoglobin 13.9 12.0 - 16.0 g/dL    Hematocrit 43.6 37.0 - 47.0 %    MCV 92.4 81.4 - 97.8 fL    MCH 29.4 27.0 - 33.0 pg    MCHC 31.9 (L) 33.6 - 35.0 g/dL    RDW 48.2 35.9 - 50.0 fL    Platelet Count 218 164 - 446 K/uL    MPV 9.4 9.0 - 12.9 fL    Neutrophils-Polys 65.20 44.00 - 72.00 %    Lymphocytes 27.70 22.00 - 41.00 %    Monocytes 6.10 0.00 - 13.40 %    Eosinophils 0.30 0.00 - 6.90 %    Basophils 0.50 0.00 - 1.80 %    Immature Granulocytes 0.20 0.00 - 0.90 %    Nucleated RBC 0.00 /100 WBC    Neutrophils (Absolute) 3.86 2.00 - 7.15 K/uL    Lymphs (Absolute) 1.64 1.00 - 4.80 K/uL    Monos (Absolute) 0.36 0.00 - 0.85 K/uL    Eos (Absolute) 0.02 0.00 - 0.51 K/uL    Baso (Absolute) 0.03 0.00 - 0.12 K/uL    Immature Granulocytes (abs) 0.01 0.00 - 0.11 K/uL    NRBC (Absolute) 0.00 K/uL   ESTIMATED GFR   Result Value Ref Range    GFR If African American >60 >60 mL/min/1.73 m 2    GFR If Non African American >60 >60 mL/min/1.73 m 2     All labs reviewed by me.    RADIOLOGY  US-RUQ   Final Result      1.  There is cholelithiasis with gallbladder sludge, mild wall thickening and biliary dilatation. In the appropriate clinical setting this could represent acute cholecystitis.           The radiologist's interpretation of all radiological studies have been reviewed by me.    COURSE & MEDICAL DECISION MAKING  Nursing notes, VS, PMSFHx reviewed in chart.     4:09 PM - Ordered with CBC with Differential, Urinalysis, HCG Qual Serum, Lipase, and CMP to evaluate the patient.      5:27 PM - Patient seen and examined at bedside. The patient presents with acute right upper quadrant pain and the differential diagnosis  includes but is not limited to colicystitis, gastritis, and pancreatitis. Ordered for US RUQ to evaluate. Patient was treated with Zofran 4 mg and Norco 5-325 mg for her symptoms as she looks quite comfortable and is afebrile here and tolerating p.o.. Discussed plan to assess for infection. Explained if infection is not indicated, then follow-up with a surgeon may be needed for cholecystectomy. I will await lab and radiology results before determining whether interventions are necessary. The patient is agreeable and understanding of my plan of care.     Laboratory studies have returned and show evidence of elevated liver function tests and pancreatitis.  She also has an elevated bilirubin of 2.3 which could indicate an obstruction.  Ultrasound does show evidence of cholelithiasis and gallbladder sludge as well as mild biliary dilatation.  This could represent acute cholecystitis.    5:45 PM - Upon repeat evaluation, the patient is resting in bed with stable vitals. Discussed lab results and plan for admission for cholelithiasis. She is agreeable with this plan.     7:00 PM discussed with the surgeon Dr. Ann.  Recommends admission by hospitalist for MRCP to further evaluate for possible biliary obstruction.    7:15 PM discussed with the hospitalist who will see the patient.  I have also placed the patient on antibiotics.    DISPOSITION:  Patient will be hospitalized in guarded condition.     FINAL IMPRESSION  1. Acute pancreatitis, unspecified complication status, unspecified pancreatitis type    2. Bile duct calculus with acute cholecystitis and obstruction          Yoshi SYLVESTER (Neil), am scribing for, and in the presence of, Alyx Cardona M.D..    Electronically signed by: Yoshi Humphries (Neil), 2/28/2021    Alyx SYLVESTER M.D. personally performed the services described in this documentation, as scribed by Yoshi Humphries in my presence, and it is both accurate and complete.    C.     The note  accurately reflects work and decisions made by me.  Alyx Cardona M.D.  2/28/2021  7:11 PM

## 2021-03-01 NOTE — PROGRESS NOTES
Pt is A+Ox4   Egyptian speaking only   Denies pain at this time   NPO, sips w/ meds    Denies N/V/D   VSS, room air   Slightly bradycardic, asymptomatic at this time   LBM PTA -void   Ambulating without staff assistance   Skin is generally CDI, PIV in RUE   Pt updated on POC, oriented to new room/floor, all needs met at this time  Hourly rounding in place

## 2021-03-01 NOTE — ASSESSMENT & PLAN NOTE
Ceftriaxone, Flagyl, symptomatic management  Follow-up MRCP, general surgery consult Dr. Ann  Chem-12 and CBC

## 2021-03-01 NOTE — DIETARY
NUTRITION SERVICES: BMI - Pt with BMI >40 (=Body mass index is 42.27 kg/m².), morbid obesity. Weight loss counseling not appropriate in acute care setting.     RECOMMEND - Referral to outpatient nutrition services for weight management after D/C.

## 2021-03-02 ENCOUNTER — ANESTHESIA (OUTPATIENT)
Dept: SURGERY | Facility: MEDICAL CENTER | Age: 56
DRG: 417 | End: 2021-03-02
Payer: COMMERCIAL

## 2021-03-02 ENCOUNTER — APPOINTMENT (OUTPATIENT)
Dept: RADIOLOGY | Facility: MEDICAL CENTER | Age: 56
DRG: 417 | End: 2021-03-02
Attending: INTERNAL MEDICINE
Payer: COMMERCIAL

## 2021-03-02 ENCOUNTER — ANESTHESIA EVENT (OUTPATIENT)
Dept: SURGERY | Facility: MEDICAL CENTER | Age: 56
DRG: 417 | End: 2021-03-02
Payer: COMMERCIAL

## 2021-03-02 PROBLEM — K81.9 CHOLECYSTITIS: Status: ACTIVE | Noted: 2021-03-02

## 2021-03-02 LAB
ALBUMIN SERPL BCP-MCNC: 3.1 G/DL (ref 3.2–4.9)
ALBUMIN/GLOB SERPL: 1 G/DL
ALP SERPL-CCNC: 364 U/L (ref 30–99)
ALT SERPL-CCNC: 161 U/L (ref 2–50)
ANION GAP SERPL CALC-SCNC: 11 MMOL/L (ref 7–16)
AST SERPL-CCNC: 172 U/L (ref 12–45)
BILIRUB SERPL-MCNC: 1.6 MG/DL (ref 0.1–1.5)
BUN SERPL-MCNC: 8 MG/DL (ref 8–22)
CALCIUM SERPL-MCNC: 8.7 MG/DL (ref 8.5–10.5)
CHLORIDE SERPL-SCNC: 112 MMOL/L (ref 96–112)
CO2 SERPL-SCNC: 20 MMOL/L (ref 20–33)
CREAT SERPL-MCNC: 0.65 MG/DL (ref 0.5–1.4)
EKG IMPRESSION: NORMAL
ERYTHROCYTE [DISTWIDTH] IN BLOOD BY AUTOMATED COUNT: 49.8 FL (ref 35.9–50)
GLOBULIN SER CALC-MCNC: 3 G/DL (ref 1.9–3.5)
GLUCOSE SERPL-MCNC: 82 MG/DL (ref 65–99)
HCT VFR BLD AUTO: 39.1 % (ref 37–47)
HGB BLD-MCNC: 12 G/DL (ref 12–16)
INR PPP: 1.1 (ref 0.87–1.13)
MCH RBC QN AUTO: 28.9 PG (ref 27–33)
MCHC RBC AUTO-ENTMCNC: 30.7 G/DL (ref 33.6–35)
MCV RBC AUTO: 94.2 FL (ref 81.4–97.8)
PLATELET # BLD AUTO: 212 K/UL (ref 164–446)
PMV BLD AUTO: 10.4 FL (ref 9–12.9)
POTASSIUM SERPL-SCNC: 3.6 MMOL/L (ref 3.6–5.5)
PROT SERPL-MCNC: 6.1 G/DL (ref 6–8.2)
PROTHROMBIN TIME: 14.5 SEC (ref 12–14.6)
RBC # BLD AUTO: 4.15 M/UL (ref 4.2–5.4)
SODIUM SERPL-SCNC: 143 MMOL/L (ref 135–145)
WBC # BLD AUTO: 5.6 K/UL (ref 4.8–10.8)

## 2021-03-02 PROCEDURE — 700105 HCHG RX REV CODE 258: Performed by: NURSE PRACTITIONER

## 2021-03-02 PROCEDURE — 160048 HCHG OR STATISTICAL LEVEL 1-5: Performed by: INTERNAL MEDICINE

## 2021-03-02 PROCEDURE — 85610 PROTHROMBIN TIME: CPT

## 2021-03-02 PROCEDURE — 501838 HCHG SUTURE GENERAL: Performed by: INTERNAL MEDICINE

## 2021-03-02 PROCEDURE — 36415 COLL VENOUS BLD VENIPUNCTURE: CPT

## 2021-03-02 PROCEDURE — 160203 HCHG ENDO MINUTES - 1ST 30 MINS LEVEL 4: Performed by: INTERNAL MEDICINE

## 2021-03-02 PROCEDURE — 74328 X-RAY BILE DUCT ENDOSCOPY: CPT

## 2021-03-02 PROCEDURE — A9270 NON-COVERED ITEM OR SERVICE: HCPCS | Performed by: ANESTHESIOLOGY

## 2021-03-02 PROCEDURE — 700111 HCHG RX REV CODE 636 W/ 250 OVERRIDE (IP): Performed by: ANESTHESIOLOGY

## 2021-03-02 PROCEDURE — 160009 HCHG ANES TIME/MIN: Performed by: INTERNAL MEDICINE

## 2021-03-02 PROCEDURE — 93010 ELECTROCARDIOGRAM REPORT: CPT | Performed by: INTERNAL MEDICINE

## 2021-03-02 PROCEDURE — 110371 HCHG SHELL REV 272: Performed by: INTERNAL MEDICINE

## 2021-03-02 PROCEDURE — 700101 HCHG RX REV CODE 250: Performed by: ANESTHESIOLOGY

## 2021-03-02 PROCEDURE — 85027 COMPLETE CBC AUTOMATED: CPT

## 2021-03-02 PROCEDURE — 160035 HCHG PACU - 1ST 60 MINS PHASE I: Performed by: INTERNAL MEDICINE

## 2021-03-02 PROCEDURE — 700101 HCHG RX REV CODE 250: Performed by: FAMILY MEDICINE

## 2021-03-02 PROCEDURE — 99233 SBSQ HOSP IP/OBS HIGH 50: CPT | Performed by: FAMILY MEDICINE

## 2021-03-02 PROCEDURE — 700117 HCHG RX CONTRAST REV CODE 255: Performed by: INTERNAL MEDICINE

## 2021-03-02 PROCEDURE — 770006 HCHG ROOM/CARE - MED/SURG/GYN SEMI*

## 2021-03-02 PROCEDURE — 700101 HCHG RX REV CODE 250: Performed by: INTERNAL MEDICINE

## 2021-03-02 PROCEDURE — 700111 HCHG RX REV CODE 636 W/ 250 OVERRIDE (IP): Performed by: FAMILY MEDICINE

## 2021-03-02 PROCEDURE — 80053 COMPREHEN METABOLIC PANEL: CPT

## 2021-03-02 PROCEDURE — 700105 HCHG RX REV CODE 258: Performed by: FAMILY MEDICINE

## 2021-03-02 PROCEDURE — 160002 HCHG RECOVERY MINUTES (STAT): Performed by: INTERNAL MEDICINE

## 2021-03-02 PROCEDURE — 0FC98ZZ EXTIRPATION OF MATTER FROM COMMON BILE DUCT, VIA NATURAL OR ARTIFICIAL OPENING ENDOSCOPIC: ICD-10-PCS | Performed by: INTERNAL MEDICINE

## 2021-03-02 PROCEDURE — 700105 HCHG RX REV CODE 258: Performed by: ANESTHESIOLOGY

## 2021-03-02 PROCEDURE — 700102 HCHG RX REV CODE 250 W/ 637 OVERRIDE(OP): Performed by: ANESTHESIOLOGY

## 2021-03-02 RX ORDER — SODIUM CHLORIDE, SODIUM LACTATE, POTASSIUM CHLORIDE, CALCIUM CHLORIDE 600; 310; 30; 20 MG/100ML; MG/100ML; MG/100ML; MG/100ML
INJECTION, SOLUTION INTRAVENOUS
Status: DISCONTINUED | OUTPATIENT
Start: 2021-03-02 | End: 2021-03-02 | Stop reason: SURG

## 2021-03-02 RX ORDER — LABETALOL HYDROCHLORIDE 5 MG/ML
5 INJECTION, SOLUTION INTRAVENOUS
Status: DISCONTINUED | OUTPATIENT
Start: 2021-03-02 | End: 2021-03-02 | Stop reason: HOSPADM

## 2021-03-02 RX ORDER — DIPHENHYDRAMINE HYDROCHLORIDE 50 MG/ML
12.5 INJECTION INTRAMUSCULAR; INTRAVENOUS
Status: DISCONTINUED | OUTPATIENT
Start: 2021-03-02 | End: 2021-03-02 | Stop reason: HOSPADM

## 2021-03-02 RX ORDER — HYDRALAZINE HYDROCHLORIDE 20 MG/ML
5 INJECTION INTRAMUSCULAR; INTRAVENOUS
Status: DISCONTINUED | OUTPATIENT
Start: 2021-03-02 | End: 2021-03-02 | Stop reason: HOSPADM

## 2021-03-02 RX ORDER — DEXAMETHASONE SODIUM PHOSPHATE 4 MG/ML
INJECTION, SOLUTION INTRA-ARTICULAR; INTRALESIONAL; INTRAMUSCULAR; INTRAVENOUS; SOFT TISSUE PRN
Status: DISCONTINUED | OUTPATIENT
Start: 2021-03-02 | End: 2021-03-02 | Stop reason: SURG

## 2021-03-02 RX ORDER — KETOROLAC TROMETHAMINE 30 MG/ML
INJECTION, SOLUTION INTRAMUSCULAR; INTRAVENOUS PRN
Status: DISCONTINUED | OUTPATIENT
Start: 2021-03-02 | End: 2021-03-02 | Stop reason: SURG

## 2021-03-02 RX ORDER — OXYCODONE HCL 5 MG/5 ML
5 SOLUTION, ORAL ORAL
Status: DISCONTINUED | OUTPATIENT
Start: 2021-03-02 | End: 2021-03-02 | Stop reason: HOSPADM

## 2021-03-02 RX ORDER — GABAPENTIN 300 MG/1
300 CAPSULE ORAL ONCE
Status: COMPLETED | OUTPATIENT
Start: 2021-03-02 | End: 2021-03-02

## 2021-03-02 RX ORDER — MIDAZOLAM HYDROCHLORIDE 1 MG/ML
1 INJECTION INTRAMUSCULAR; INTRAVENOUS
Status: DISCONTINUED | OUTPATIENT
Start: 2021-03-02 | End: 2021-03-02 | Stop reason: HOSPADM

## 2021-03-02 RX ORDER — LIDOCAINE HYDROCHLORIDE 20 MG/ML
INJECTION, SOLUTION EPIDURAL; INFILTRATION; INTRACAUDAL; PERINEURAL PRN
Status: DISCONTINUED | OUTPATIENT
Start: 2021-03-02 | End: 2021-03-02 | Stop reason: SURG

## 2021-03-02 RX ORDER — METOPROLOL TARTRATE 1 MG/ML
1 INJECTION, SOLUTION INTRAVENOUS
Status: DISCONTINUED | OUTPATIENT
Start: 2021-03-02 | End: 2021-03-02 | Stop reason: HOSPADM

## 2021-03-02 RX ORDER — MEPERIDINE HYDROCHLORIDE 50 MG/ML
12.5 INJECTION INTRAMUSCULAR; INTRAVENOUS; SUBCUTANEOUS
Status: DISCONTINUED | OUTPATIENT
Start: 2021-03-02 | End: 2021-03-02 | Stop reason: HOSPADM

## 2021-03-02 RX ORDER — CEFAZOLIN SODIUM 1 G/3ML
INJECTION, POWDER, FOR SOLUTION INTRAMUSCULAR; INTRAVENOUS PRN
Status: DISCONTINUED | OUTPATIENT
Start: 2021-03-02 | End: 2021-03-02 | Stop reason: SURG

## 2021-03-02 RX ORDER — SODIUM CHLORIDE, SODIUM LACTATE, POTASSIUM CHLORIDE, CALCIUM CHLORIDE 600; 310; 30; 20 MG/100ML; MG/100ML; MG/100ML; MG/100ML
INJECTION, SOLUTION INTRAVENOUS CONTINUOUS
Status: DISCONTINUED | OUTPATIENT
Start: 2021-03-02 | End: 2021-03-02 | Stop reason: HOSPADM

## 2021-03-02 RX ORDER — OXYCODONE HCL 5 MG/5 ML
10 SOLUTION, ORAL ORAL
Status: DISCONTINUED | OUTPATIENT
Start: 2021-03-02 | End: 2021-03-02 | Stop reason: HOSPADM

## 2021-03-02 RX ORDER — ONDANSETRON 2 MG/ML
4 INJECTION INTRAMUSCULAR; INTRAVENOUS
Status: DISCONTINUED | OUTPATIENT
Start: 2021-03-02 | End: 2021-03-02 | Stop reason: HOSPADM

## 2021-03-02 RX ORDER — HALOPERIDOL 5 MG/ML
1 INJECTION INTRAMUSCULAR
Status: DISCONTINUED | OUTPATIENT
Start: 2021-03-02 | End: 2021-03-02 | Stop reason: HOSPADM

## 2021-03-02 RX ORDER — ONDANSETRON 2 MG/ML
INJECTION INTRAMUSCULAR; INTRAVENOUS PRN
Status: DISCONTINUED | OUTPATIENT
Start: 2021-03-02 | End: 2021-03-02 | Stop reason: SURG

## 2021-03-02 RX ORDER — ACETAMINOPHEN 500 MG
1000 TABLET ORAL ONCE
Status: COMPLETED | OUTPATIENT
Start: 2021-03-02 | End: 2021-03-02

## 2021-03-02 RX ORDER — HYDROMORPHONE HYDROCHLORIDE 1 MG/ML
0.2 INJECTION, SOLUTION INTRAMUSCULAR; INTRAVENOUS; SUBCUTANEOUS
Status: DISCONTINUED | OUTPATIENT
Start: 2021-03-02 | End: 2021-03-02 | Stop reason: HOSPADM

## 2021-03-02 RX ORDER — HYDROMORPHONE HYDROCHLORIDE 1 MG/ML
0.4 INJECTION, SOLUTION INTRAMUSCULAR; INTRAVENOUS; SUBCUTANEOUS
Status: DISCONTINUED | OUTPATIENT
Start: 2021-03-02 | End: 2021-03-02 | Stop reason: HOSPADM

## 2021-03-02 RX ORDER — HYDROMORPHONE HYDROCHLORIDE 1 MG/ML
0.1 INJECTION, SOLUTION INTRAMUSCULAR; INTRAVENOUS; SUBCUTANEOUS
Status: DISCONTINUED | OUTPATIENT
Start: 2021-03-02 | End: 2021-03-02 | Stop reason: HOSPADM

## 2021-03-02 RX ADMIN — SODIUM CHLORIDE, POTASSIUM CHLORIDE, SODIUM LACTATE AND CALCIUM CHLORIDE: 600; 310; 30; 20 INJECTION, SOLUTION INTRAVENOUS at 12:56

## 2021-03-02 RX ADMIN — ROCURONIUM BROMIDE 50 MG: 10 INJECTION, SOLUTION INTRAVENOUS at 13:01

## 2021-03-02 RX ADMIN — DEXAMETHASONE SODIUM PHOSPHATE 4 MG: 4 INJECTION, SOLUTION INTRA-ARTICULAR; INTRALESIONAL; INTRAMUSCULAR; INTRAVENOUS; SOFT TISSUE at 13:01

## 2021-03-02 RX ADMIN — PROPOFOL 120 MG: 10 INJECTION, EMULSION INTRAVENOUS at 13:01

## 2021-03-02 RX ADMIN — METRONIDAZOLE 500 MG: 500 INJECTION, SOLUTION INTRAVENOUS at 04:53

## 2021-03-02 RX ADMIN — KETOROLAC TROMETHAMINE 30 MG: 30 INJECTION, SOLUTION INTRAMUSCULAR at 13:17

## 2021-03-02 RX ADMIN — SODIUM CHLORIDE: 9 INJECTION, SOLUTION INTRAVENOUS at 20:06

## 2021-03-02 RX ADMIN — LIDOCAINE HYDROCHLORIDE 100 MG: 20 INJECTION, SOLUTION EPIDURAL; INFILTRATION; INTRACAUDAL at 13:01

## 2021-03-02 RX ADMIN — CEFTRIAXONE SODIUM 1 G: 1 INJECTION, POWDER, FOR SOLUTION INTRAMUSCULAR; INTRAVENOUS at 19:59

## 2021-03-02 RX ADMIN — FENTANYL CITRATE 50 MCG: 50 INJECTION, SOLUTION INTRAMUSCULAR; INTRAVENOUS at 13:08

## 2021-03-02 RX ADMIN — FENTANYL CITRATE 50 MCG: 50 INJECTION, SOLUTION INTRAMUSCULAR; INTRAVENOUS at 13:01

## 2021-03-02 RX ADMIN — METRONIDAZOLE 500 MG: 500 INJECTION, SOLUTION INTRAVENOUS at 22:01

## 2021-03-02 RX ADMIN — GABAPENTIN 300 MG: 300 CAPSULE ORAL at 12:46

## 2021-03-02 RX ADMIN — MIDAZOLAM 1 MG: 1 INJECTION INTRAMUSCULAR; INTRAVENOUS at 12:55

## 2021-03-02 RX ADMIN — MIDAZOLAM 1 MG: 1 INJECTION INTRAMUSCULAR; INTRAVENOUS at 12:57

## 2021-03-02 RX ADMIN — SUGAMMADEX 200 MG: 100 INJECTION, SOLUTION INTRAVENOUS at 13:17

## 2021-03-02 RX ADMIN — ONDANSETRON 4 MG: 2 INJECTION INTRAMUSCULAR; INTRAVENOUS at 13:15

## 2021-03-02 RX ADMIN — ACETAMINOPHEN 1000 MG: 500 TABLET ORAL at 12:46

## 2021-03-02 RX ADMIN — SODIUM CHLORIDE: 9 INJECTION, SOLUTION INTRAVENOUS at 04:07

## 2021-03-02 RX ADMIN — CEFAZOLIN 2 G: 330 INJECTION, POWDER, FOR SOLUTION INTRAMUSCULAR; INTRAVENOUS at 13:05

## 2021-03-02 ASSESSMENT — ENCOUNTER SYMPTOMS
HEADACHES: 0
NERVOUS/ANXIOUS: 0
HEARTBURN: 0
FEVER: 0
PALPITATIONS: 0
FOCAL WEAKNESS: 0
DIARRHEA: 0
DIZZINESS: 0
WHEEZING: 0
WEAKNESS: 0
SENSORY CHANGE: 0
SHORTNESS OF BREATH: 0
SPEECH CHANGE: 0
BACK PAIN: 0
BLURRED VISION: 0
SORE THROAT: 0
COUGH: 0
VOMITING: 0
CHILLS: 0
NAUSEA: 0
ABDOMINAL PAIN: 1
DIAPHORESIS: 0
NECK PAIN: 0
MYALGIAS: 0
FLANK PAIN: 0

## 2021-03-02 ASSESSMENT — PAIN DESCRIPTION - PAIN TYPE
TYPE: ACUTE PAIN

## 2021-03-02 NOTE — DISCHARGE PLANNING
Anticipated Discharge Disposition: TBD    Action: Patient discussed in IDT rounds. Per MD the patient is awaiting a cholecystectomy. No discharge needs known at this time.     Barriers to Discharge: medical clearance    Plan: Follow up with medical team

## 2021-03-02 NOTE — PROGRESS NOTES
Patient seen and examined before proceeding with ERCP sphincterotomy common bile duct stone extraction under fluorsoscopy and anesthesia with possible temporary stent, biopsies and/or other endotherapy. Risks, benefits, and alternatives of aforementioned procedures were discussed with patient with a  in detail before proceeding.  Patient was given opportunities to ask questions and discuss other options.  Risks including but not limited to pancreatitis, contrast reaction, radiation exposure, retained choledocholithiasis, stent if placed patient responsibility to help facilitate/schedule removal within 3 months or less, perforation, infection, bleeding, missed lesion(s), possible need for surgery(ies) and/or interventional radiology, possible need for repeat procedure(s) and/or additional testing, hospitalization possibly prolonged, cardiac and/or pulmonary event, aspiration, hypoxia, stroke, medication (s) and/or anesthesia reaction(s), indefinite diagnosis, discomfort/pain, unsuccessful and/or incomplete procedure, ineffective therapy, persistent symptoms, damage to adjacent organs/structure and/or vascular, and other adverse events possibly life-threatening.  Interactive discussion was undertaken with Layman's terms.  I answered questions in full and to satisfaction.  Patient stated understanding and acceptance of these risks, and wished to proceed.  I gave opportunity to cancel/delay/reschedule.  Informed consent was given in clear state of mind and paper permit was signed.

## 2021-03-02 NOTE — PROCEDURES
Pre-procedure Diagnoses   Abnormal findings on imaging of biliary tract [R93.2]   Abnormal magnetic resonance cholangiopancreatography (MRCP) [R93.3]   Acute gallstone pancreatitis [K85.10]   Epigastric pain [R10.13]   Abnormal liver enzymes [R74.8]   Post-procedure Diagnoses   Calculus of bile duct with acute cholangitis with obstruction [K80.33]   Procedures   ENDOSCOPIC RETROGRADE CHOLANGIOPANCREATOGRAPHY (ERCP) WITH REMOVAL OF STONES FROM BILIARY DUCTS [52609 (CPT®)]   ENDOSCOPIC RETROGRADE CHOLANGIOPANCREATOGRAPHY (ERCP) BILIARY SPHINCTEROTOMY [52929 (CPT®)]   CHOLANGIOGRAPHY X-RAYS OF BILE DUCTS VIA ENDOSCOPY [58450 (CPT®)]     Endoscopist: Addi Gilmore MD, [facility], [facility]    Anesthesiologist:Navin Ko M.D.    Premedications: ancef IV    Consent: Risks, benefits, and alternatives of aforementioned procedures were with patient in detail with a  in detail before proceeding.  Patient was given opportunities to ask questions and discuss other options.  Risks including but not limited to pancreatitis, contrast reaction, radiation exposure, retained choledocholithiasis, stent if placed patient responsibility to help facilitate/schedule removal within 3 months or less, perforation, infection, bleeding, missed lesion(s), possible need for surgery(ies) and/or interventional radiology, possible need for repeat procedure(s) and/or additional testing, hospitalization possibly prolonged, cardiac and/or pulmonary event, aspiration, hypoxia, stroke, medication (s) and/or anesthesia reaction(s), indefinite diagnosis, discomfort/pain, unsuccessful and/or incomplete procedure, ineffective therapy, persistent symptoms, damage to adjacent organs/structure and/or vascular, and other adverse events possibly life-threatening.  Interactive discussion was undertaken with Layman's terms.  I answered questions in full and to satisfaction.  Patient stated understanding and acceptance of these risks, and wished to proceed.  " I gave opportunity to cancel/delay/reschedule.  Informed consent was given in clear state of mind and paper permit was signed.    Endoscopic procedures in detail: Olympus side-viewing ERCP flexible duodenoscope was inserted from mouth to 2nd portion of the duodenum.  Biliary duct was selectively cannulated on the first attempt, successful free cannulation was achieved.  Cholangiogram was injected and completed.  Biliary sphincterotomy was performed with a bow papillotome with subsequent balloon common bile duct stones extraction, clearance was complete without evidence of any residual common bile duct stones on completion cholangiogram. The C-arm was moved and rotated on rainbow axis to optimize imaging of intrahepatic biliary systems in different angles to avoid missing lesions/strictures with ductal overlap and/or image angulation. The balloon was inflated within the right and left intrahepatic ducts and dragged through the entire length of the bile duct out the biliary os multiple times in order to minimize risk of retained stones. The ERCP scope was removed from duodenum to also image the common bile duct \"behind\" the ERCP scope.  Of note, no radiologist was present to help obtain nor read ERCP images.  I was the sole physician who obtained and performed interpretation of static and dynamic ERCP fluoroscopic x-ray images, no over-read was requested from the radiologist nor was it necessary.  I suctioned insufflated air and stomach fluid contents upon removal.    Procedure times:  - In-room 12:56  - Start 13:08  - Completed 13:17  - Out of room per nursing records    Endoscopic Retrograde CholangioPancreatography Findings:  - Ampulla of Vater: located in 2nd portion of duodenum, appeared edematous and bulging from impacted common bile duct stone, otherwise endoscopically unremarkable.  - Cholangiogram: multiple filling defects with upstream ductal dilation to greater than 1 cm, cystic duct partially filled but " did not opacify gallbladder. No evidence of biliary stricture.  - Pancreatogram: intentionally not injected nor cannulated.  - Therapy: biliary sphincterotomy performed with balloon common bile duct stones x10 extraction and pus, multiple yellowish 3 mm to 1cm stones removed.    Impression: Choledocholithiasis with cholangitis and obstruction, treated with biliary sphincterotomy and balloon extraction, clearance completed.    Recommendations:   1.  Routine post-endoscopy anesthesia recovery care.  Transfer patient to prior hospital room when she is awake, alert and comfortable, when recovery criteria are met.  Aspiration and fall precautions x 24 hours.   2.  Continue with antibiotics for at least 5 more days.   3.  Lap janet pending by Dr. Fredo Ann or Dr Norman Orozco  4.  GIC signed off but please feel free to call us with questions, concerns and/or problems.  5. I spoke to patient and recovery nurse about impression, diagnosis and recommendations.  I answered questions in full and to satisfaction

## 2021-03-02 NOTE — OR NURSING
1327-Pt arrived from OR. Report received. Pt denying pain and nausea at this time.    1338- Report called to [address].    1348- Pt taking sips of water. Pt on room air. Pt denying pain and nausea at this time.    1418-Pt transported to room. All personal belongings and chart with pt.

## 2021-03-02 NOTE — PROGRESS NOTES
[facility] Medicine Daily Progress Note    Date of Service  3/2/2021    Chief Complaint  55 y.o. female admitted 2/28/2021 with gallstone pancreatitis.    Hospital Course  Admitted with gallstone pancreatitis, cholelithiasis and cholecystitis, choledocholithiasis.  Surgery and gastroenterology were consulted on the case.    Interval Problem Update  Choledocholithiasis - for ERCP today, discussed case with Gastroenterology  Cholecystitis - discussed case with Surgery, plan for laparoscopic cholecystectomy tomorrow    A qualified  was used to interpret Divehi during this encounter.  ’s name/ID number was [id number] and mode of interpretation was iPad. The content of the interpretation included Patient Education, Medications, History/Visit information.    Consultants/Specialty  Surgery  Gastroenterology    Code Status  Full Code    Disposition  TBD    Review of Systems  Review of Systems   Constitutional: Negative for chills, diaphoresis, fever and malaise/fatigue.   HENT: Negative for congestion, hearing loss and sore throat.    Eyes: Negative for blurred vision.   Respiratory: Negative for cough, shortness of breath and wheezing.    Cardiovascular: Negative for chest pain, palpitations and leg swelling.   Gastrointestinal: Positive for abdominal pain. Negative for diarrhea, heartburn, nausea and vomiting.   Genitourinary: Negative for dysuria, flank pain and hematuria.   Musculoskeletal: Negative for back pain, joint pain, myalgias and neck pain.   Skin: Negative for rash.   Neurological: Negative for dizziness, sensory change, speech change, focal weakness, weakness and headaches.   Psychiatric/Behavioral: The patient is not nervous/anxious.         Physical Exam  Temp:  [36.6 °C (97.9 °F)-37.1 °C (98.8 °F)] 36.6 °C (97.9 °F)  Pulse:  [46-59] 46  Resp:  [16-18] 16  BP: ()/(47-55) 106/47  SpO2:  [94 %-100 %] 100 %    Physical Exam  Vitals and nursing note reviewed.   Constitutional:        Appearance: She is obese.   HENT:      Head: Normocephalic and atraumatic.      Nose: No congestion.      Mouth/Throat:      Mouth: Mucous membranes are moist.   Eyes:      Extraocular Movements: Extraocular movements intact.      Conjunctiva/sclera: Conjunctivae normal.      Pupils: Pupils are equal, round, and reactive to light.   Cardiovascular:      Rate and Rhythm: Normal rate and regular rhythm.   Pulmonary:      Effort: Pulmonary effort is normal.      Breath sounds: Normal breath sounds.   Abdominal:      General: Bowel sounds are normal. There is no distension.      Palpations: Abdomen is soft.      Tenderness: There is no abdominal tenderness. There is no guarding or rebound.   Musculoskeletal:      Cervical back: No tenderness.      Right lower leg: No edema.      Left lower leg: No edema.   Skin:     General: Skin is warm and dry.   Neurological:      General: No focal deficit present.      Mental Status: She is alert and oriented to person, place, and time.      Cranial Nerves: No cranial nerve deficit.         Fluids    Intake/Output Summary (Last 24 hours) at 3/2/2021 1147  Last data filed at 3/2/2021 0830  Gross per 24 hour   Intake 3540 ml   Output 0 ml   Net 3540 ml       Laboratory  Recent Labs     02/28/21  1845 03/01/21  0625 03/02/21  0334   WBC 5.9 4.5* 5.6   RBC 4.72 4.47 4.15*   HEMOGLOBIN 13.9 12.9 12.0   HEMATOCRIT 43.6 42.0 39.1   MCV 92.4 94.0 94.2   MCH 29.4 28.9 28.9   MCHC 31.9* 30.7* 30.7*   RDW 48.2 49.0 49.8   PLATELETCT 218 205 212   MPV 9.4 9.9 10.4     Recent Labs     02/28/21  1629 03/01/21  0625 03/02/21  0334   SODIUM 138 140 143   POTASSIUM 4.3 3.5* 3.6   CHLORIDE 104 109 112   CO2 20 20 20   GLUCOSE 99 79 82   BUN 11 9 8   CREATININE 0.46* 0.45* 0.65   CALCIUM 9.9 8.9 8.7     Recent Labs     03/02/21  0334   INR 1.10               Imaging  [id number]   Final Result      1.  Distal common bile duct stone measuring 5 mm with extrahepatic biliary dilation.   2.   Cholelithiasis and gallbladder wall thickening suggesting acute cholecystitis.      US-RUQ   Final Result      1.  There is cholelithiasis with gallbladder sludge, mild wall thickening and biliary dilatation. In the appropriate clinical setting this could represent acute cholecystitis.         [id number] DUCTS    (Results Pending)        Assessment/Plan  * Acute gallstone pancreatitis- (present on admission)  Assessment & Plan  IVF hydration  Pain control    Cholecystitis- (present on admission)  Assessment & Plan  IV Rocephin and Flagyl  Plan for laparoscopic cholecystectomy    Choledocholithiasis- (present on admission)  Assessment & Plan  For ERCP    Hypokalemia- (present on admission)  Assessment & Plan  Follow bmp, Mg, Ph    Class 3 severe obesity due to excess calories with serious comorbidity and body mass index (BMI) of 40.0 to 44.9 in adult (HCC)- (present on admission)  Assessment & Plan  Body mass index is 42.27 kg/m².         VTE prophylaxis: Heparin

## 2021-03-02 NOTE — ANESTHESIA TIME REPORT
Anesthesia Start and Stop Event Times     Date Time Event    3/2/2021 1228 Ready for Procedure     1256 Anesthesia Start     1329 Anesthesia Stop        Responsible Staff  03/02/21    Name Role Begin End    Navin Ko M.D. Anesth 1256 1329        Preop Diagnosis (Free Text):  Pre-op Diagnosis     Epigastric pain/ Elevated liver enzymes/ Choledocholithiasis/ Acute cholecystitis        Preop Diagnosis (Codes):    Post op Diagnosis  Choledocholithiasis      Premium Reason  Non-Premium    Comments:

## 2021-03-02 NOTE — CARE PLAN
Problem: Communication  Goal: The ability to communicate needs accurately and effectively will improve  Outcome: PROGRESSING AS EXPECTED  Note:  used for patient communication     Problem: Pain Management  Goal: Pain level will decrease to patient's comfort goal  Outcome: PROGRESSING AS EXPECTED

## 2021-03-02 NOTE — ANESTHESIA PREPROCEDURE EVALUATION
Relevant Problems   Other   (+) Class 3 severe obesity due to excess calories with serious comorbidity and body mass index (BMI) of 40.0 to 44.9 in adult (HCC)       Physical Exam    Airway   Mallampati: II  TM distance: >3 FB  Neck ROM: full       Cardiovascular - normal exam  Rhythm: regular  Rate: normal  (-) murmur     Dental - normal exam  (+) upper dentures  Comments: Upper partial         Pulmonary - normal exam  Breath sounds clear to auscultation     Abdominal   (+) obese     Neurological - normal exam                 Anesthesia Plan    ASA 3   ASA physical status 3 criteria: morbid obesity - BMI greater than or equal to 40    Plan - general       Airway plan will be ETT          Induction: intravenous    Postoperative Plan: Postoperative administration of opioids is intended.    Pertinent diagnostic labs and testing reviewed    Informed Consent:    Anesthetic plan and risks discussed with patient.    Use of blood products discussed with: patient whom consented to blood products.

## 2021-03-02 NOTE — CARE PLAN
Problem: Communication  Goal: The ability to communicate needs accurately and effectively will improve  Outcome: PROGRESSING AS EXPECTED  IPAD  in use, pt communicating efficiently      Problem: Safety  Goal: Will remain free from falls  Outcome: PROGRESSING AS EXPECTED  Pt educated on fall risk and precautions      Problem: Pain Management  Goal: Pain level will decrease to patient's comfort goal  Outcome: PROGRESSING AS EXPECTED  Pt medicated per MAR for 8/10 pain, reassessing PRN

## 2021-03-02 NOTE — PROGRESS NOTES
Bedside report received. Assessment completed.  Pt is A&O x4. Pt on room air.   Medicating for pain PRN per MAR Pain 2/10  Denies nausea.   - numbness, - tingling.  Skin intact    Last BM PTA . +flatus,   +void.  NPO at this time for procedure today.  Pt up self.  Call light and belongings within reach. All needs met at this time. Fall Precautions and hourly rounding in place.

## 2021-03-02 NOTE — ANESTHESIA PROCEDURE NOTES
Airway    Date/Time: 3/2/2021 1:03 PM  Performed by: Navin Ko M.D.  Authorized by: Navin Ko M.D.     Location:  OR  Urgency:  Elective  Indications for Airway Management:  Anesthesia      Spontaneous Ventilation: absent    Sedation Level:  Deep  Preoxygenated: Yes    Patient Position:  Sniffing  Mask Difficulty Assessment:  1 - vent by mask  Final Airway Type:  Endotracheal airway  Final Endotracheal Airway:  ETT  Cuffed: Yes    Technique Used for Successful ETT Placement:  Direct laryngoscopy    Insertion Site:  Oral  Blade Type:  Ashlyn  Laryngoscope Blade/Videolaryngoscope Blade Size:  3  ETT Size (mm):  7.0  Measured from:  Teeth  ETT to Teeth (cm):  21  Placement Verified by: auscultation and capnometry    Cormack-Lehane Classification:  Grade I - full view of glottis  Number of Attempts at Approach:  1

## 2021-03-02 NOTE — PROGRESS NOTES
Pt is A+Ox4   Romanian speaking only, Ipad  used   Complains of 8/10 pain, medicated per MAR   Tolerating a clear liquid diet               Denies N/V/D    Will be NPO sips w/ meds at 0000  VSS, room air   Baseline bradycardic, asymptomatic at this time    MD aware   LBM PTA +void   Ambulating without staff assistance   Skin is generally CDI, PIV in RUE   Pt updated on POC, all needs met at this time, fall precautions in place   Hourly rounding in place

## 2021-03-03 ENCOUNTER — ANESTHESIA EVENT (OUTPATIENT)
Dept: SURGERY | Facility: MEDICAL CENTER | Age: 56
DRG: 417 | End: 2021-03-03
Payer: COMMERCIAL

## 2021-03-03 ENCOUNTER — ANESTHESIA (OUTPATIENT)
Dept: SURGERY | Facility: MEDICAL CENTER | Age: 56
DRG: 417 | End: 2021-03-03
Payer: COMMERCIAL

## 2021-03-03 PROBLEM — E83.42 HYPOMAGNESEMIA: Status: ACTIVE | Noted: 2021-03-03

## 2021-03-03 LAB
ALBUMIN SERPL BCP-MCNC: 3 G/DL (ref 3.2–4.9)
ALBUMIN/GLOB SERPL: 0.9 G/DL
ALP SERPL-CCNC: 386 U/L (ref 30–99)
ALT SERPL-CCNC: 143 U/L (ref 2–50)
ANION GAP SERPL CALC-SCNC: 12 MMOL/L (ref 7–16)
AST SERPL-CCNC: 135 U/L (ref 12–45)
BASOPHILS # BLD AUTO: 0.6 % (ref 0–1.8)
BASOPHILS # BLD: 0.03 K/UL (ref 0–0.12)
BILIRUB SERPL-MCNC: 1 MG/DL (ref 0.1–1.5)
BUN SERPL-MCNC: 6 MG/DL (ref 8–22)
CALCIUM SERPL-MCNC: 8.9 MG/DL (ref 8.5–10.5)
CHLORIDE SERPL-SCNC: 107 MMOL/L (ref 96–112)
CO2 SERPL-SCNC: 19 MMOL/L (ref 20–33)
CREAT SERPL-MCNC: 0.54 MG/DL (ref 0.5–1.4)
EOSINOPHIL # BLD AUTO: 0.01 K/UL (ref 0–0.51)
EOSINOPHIL NFR BLD: 0.2 % (ref 0–6.9)
ERYTHROCYTE [DISTWIDTH] IN BLOOD BY AUTOMATED COUNT: 51.4 FL (ref 35.9–50)
GLOBULIN SER CALC-MCNC: 3.2 G/DL (ref 1.9–3.5)
GLUCOSE SERPL-MCNC: 98 MG/DL (ref 65–99)
HCT VFR BLD AUTO: 37.5 % (ref 37–47)
HGB BLD-MCNC: 11.9 G/DL (ref 12–16)
IMM GRANULOCYTES # BLD AUTO: 0.02 K/UL (ref 0–0.11)
IMM GRANULOCYTES NFR BLD AUTO: 0.4 % (ref 0–0.9)
LIPASE SERPL-CCNC: 40 U/L (ref 11–82)
LYMPHOCYTES # BLD AUTO: 1.43 K/UL (ref 1–4.8)
LYMPHOCYTES NFR BLD: 26.6 % (ref 22–41)
MAGNESIUM SERPL-MCNC: 1.9 MG/DL (ref 1.5–2.5)
MCH RBC QN AUTO: 29.7 PG (ref 27–33)
MCHC RBC AUTO-ENTMCNC: 31.7 G/DL (ref 33.6–35)
MCV RBC AUTO: 93.5 FL (ref 81.4–97.8)
MONOCYTES # BLD AUTO: 0.3 K/UL (ref 0–0.85)
MONOCYTES NFR BLD AUTO: 5.6 % (ref 0–13.4)
NEUTROPHILS # BLD AUTO: 3.59 K/UL (ref 2–7.15)
NEUTROPHILS NFR BLD: 66.6 % (ref 44–72)
NRBC # BLD AUTO: 0 K/UL
NRBC BLD-RTO: 0 /100 WBC
PATHOLOGY CONSULT NOTE: NORMAL
PHOSPHATE SERPL-MCNC: 3.4 MG/DL (ref 2.5–4.5)
PLATELET # BLD AUTO: 176 K/UL (ref 164–446)
PMV BLD AUTO: 9.9 FL (ref 9–12.9)
POTASSIUM SERPL-SCNC: 3.3 MMOL/L (ref 3.6–5.5)
PROT SERPL-MCNC: 6.2 G/DL (ref 6–8.2)
RBC # BLD AUTO: 4.01 M/UL (ref 4.2–5.4)
SODIUM SERPL-SCNC: 138 MMOL/L (ref 135–145)
WBC # BLD AUTO: 5.4 K/UL (ref 4.8–10.8)

## 2021-03-03 PROCEDURE — 502571 HCHG PACK, LAP CHOLE: Performed by: SURGERY

## 2021-03-03 PROCEDURE — 770001 HCHG ROOM/CARE - MED/SURG/GYN PRIV*

## 2021-03-03 PROCEDURE — 700102 HCHG RX REV CODE 250 W/ 637 OVERRIDE(OP): Performed by: ANESTHESIOLOGY

## 2021-03-03 PROCEDURE — A9270 NON-COVERED ITEM OR SERVICE: HCPCS | Performed by: ANESTHESIOLOGY

## 2021-03-03 PROCEDURE — 99233 SBSQ HOSP IP/OBS HIGH 50: CPT | Performed by: FAMILY MEDICINE

## 2021-03-03 PROCEDURE — A9270 NON-COVERED ITEM OR SERVICE: HCPCS | Performed by: INTERNAL MEDICINE

## 2021-03-03 PROCEDURE — 0FT44ZZ RESECTION OF GALLBLADDER, PERCUTANEOUS ENDOSCOPIC APPROACH: ICD-10-PCS | Performed by: SURGERY

## 2021-03-03 PROCEDURE — 501583 HCHG TROCAR, THRD CAN&SEAL 5X100: Performed by: SURGERY

## 2021-03-03 PROCEDURE — RXMED WILLOW AMBULATORY MEDICATION CHARGE: Performed by: FAMILY MEDICINE

## 2021-03-03 PROCEDURE — 501838 HCHG SUTURE GENERAL: Performed by: SURGERY

## 2021-03-03 PROCEDURE — 700111 HCHG RX REV CODE 636 W/ 250 OVERRIDE (IP): Performed by: FAMILY MEDICINE

## 2021-03-03 PROCEDURE — 500002 HCHG ADHESIVE, DERMABOND: Performed by: SURGERY

## 2021-03-03 PROCEDURE — 160036 HCHG PACU - EA ADDL 30 MINS PHASE I: Performed by: SURGERY

## 2021-03-03 PROCEDURE — 501338 HCHG SHEARS, ENDO: Performed by: SURGERY

## 2021-03-03 PROCEDURE — 700102 HCHG RX REV CODE 250 W/ 637 OVERRIDE(OP): Performed by: INTERNAL MEDICINE

## 2021-03-03 PROCEDURE — 700111 HCHG RX REV CODE 636 W/ 250 OVERRIDE (IP): Performed by: ANESTHESIOLOGY

## 2021-03-03 PROCEDURE — 80053 COMPREHEN METABOLIC PANEL: CPT

## 2021-03-03 PROCEDURE — 160002 HCHG RECOVERY MINUTES (STAT): Performed by: SURGERY

## 2021-03-03 PROCEDURE — 85025 COMPLETE CBC W/AUTO DIFF WBC: CPT

## 2021-03-03 PROCEDURE — 501570 HCHG TROCAR, SEPARATOR: Performed by: SURGERY

## 2021-03-03 PROCEDURE — 700101 HCHG RX REV CODE 250: Performed by: SURGERY

## 2021-03-03 PROCEDURE — 160009 HCHG ANES TIME/MIN: Performed by: SURGERY

## 2021-03-03 PROCEDURE — 500868 HCHG NEEDLE, SURGI(VARES): Performed by: SURGERY

## 2021-03-03 PROCEDURE — 160029 HCHG SURGERY MINUTES - 1ST 30 MINS LEVEL 4: Performed by: SURGERY

## 2021-03-03 PROCEDURE — 84100 ASSAY OF PHOSPHORUS: CPT

## 2021-03-03 PROCEDURE — 83690 ASSAY OF LIPASE: CPT

## 2021-03-03 PROCEDURE — 501664 HCHG TUBING, FILTER STRYKER: Performed by: SURGERY

## 2021-03-03 PROCEDURE — 700105 HCHG RX REV CODE 258: Performed by: FAMILY MEDICINE

## 2021-03-03 PROCEDURE — 88304 TISSUE EXAM BY PATHOLOGIST: CPT

## 2021-03-03 PROCEDURE — 160048 HCHG OR STATISTICAL LEVEL 1-5: Performed by: SURGERY

## 2021-03-03 PROCEDURE — 160035 HCHG PACU - 1ST 60 MINS PHASE I: Performed by: SURGERY

## 2021-03-03 PROCEDURE — 700105 HCHG RX REV CODE 258: Performed by: NURSE PRACTITIONER

## 2021-03-03 PROCEDURE — 700101 HCHG RX REV CODE 250: Performed by: FAMILY MEDICINE

## 2021-03-03 PROCEDURE — 700105 HCHG RX REV CODE 258: Performed by: ANESTHESIOLOGY

## 2021-03-03 PROCEDURE — 501571 HCHG TROCAR, SEPARATOR 12X100: Performed by: SURGERY

## 2021-03-03 PROCEDURE — 501399 HCHG SPECIMAN BAG, ENDO CATC: Performed by: SURGERY

## 2021-03-03 PROCEDURE — 160041 HCHG SURGERY MINUTES - EA ADDL 1 MIN LEVEL 4: Performed by: SURGERY

## 2021-03-03 PROCEDURE — 83735 ASSAY OF MAGNESIUM: CPT

## 2021-03-03 PROCEDURE — 700101 HCHG RX REV CODE 250: Performed by: ANESTHESIOLOGY

## 2021-03-03 RX ORDER — DEXAMETHASONE SODIUM PHOSPHATE 4 MG/ML
INJECTION, SOLUTION INTRA-ARTICULAR; INTRALESIONAL; INTRAMUSCULAR; INTRAVENOUS; SOFT TISSUE PRN
Status: DISCONTINUED | OUTPATIENT
Start: 2021-03-03 | End: 2021-03-03 | Stop reason: SURG

## 2021-03-03 RX ORDER — OXYCODONE HYDROCHLORIDE 5 MG/1
5 TABLET ORAL EVERY 6 HOURS PRN
Qty: 20 TABLET | Refills: 0 | Status: SHIPPED | OUTPATIENT
Start: 2021-03-03 | End: 2021-03-09

## 2021-03-03 RX ORDER — MAGNESIUM SULFATE HEPTAHYDRATE 40 MG/ML
2 INJECTION, SOLUTION INTRAVENOUS ONCE
Status: COMPLETED | OUTPATIENT
Start: 2021-03-03 | End: 2021-03-03

## 2021-03-03 RX ORDER — ONDANSETRON 4 MG/1
4 TABLET, ORALLY DISINTEGRATING ORAL EVERY 4 HOURS PRN
Status: DISCONTINUED | OUTPATIENT
Start: 2021-03-03 | End: 2021-03-04 | Stop reason: HOSPADM

## 2021-03-03 RX ORDER — HYDROMORPHONE HYDROCHLORIDE 1 MG/ML
0.1 INJECTION, SOLUTION INTRAMUSCULAR; INTRAVENOUS; SUBCUTANEOUS
Status: DISCONTINUED | OUTPATIENT
Start: 2021-03-03 | End: 2021-03-03 | Stop reason: HOSPADM

## 2021-03-03 RX ORDER — SODIUM CHLORIDE, SODIUM LACTATE, POTASSIUM CHLORIDE, CALCIUM CHLORIDE 600; 310; 30; 20 MG/100ML; MG/100ML; MG/100ML; MG/100ML
INJECTION, SOLUTION INTRAVENOUS CONTINUOUS
Status: DISCONTINUED | OUTPATIENT
Start: 2021-03-03 | End: 2021-03-03 | Stop reason: HOSPADM

## 2021-03-03 RX ORDER — LIDOCAINE HYDROCHLORIDE 40 MG/ML
SOLUTION TOPICAL PRN
Status: DISCONTINUED | OUTPATIENT
Start: 2021-03-03 | End: 2021-03-03 | Stop reason: SURG

## 2021-03-03 RX ORDER — CEFOTETAN DISODIUM 2 G/20ML
INJECTION, POWDER, FOR SOLUTION INTRAMUSCULAR; INTRAVENOUS PRN
Status: DISCONTINUED | OUTPATIENT
Start: 2021-03-03 | End: 2021-03-03 | Stop reason: SURG

## 2021-03-03 RX ORDER — CEFDINIR 300 MG/1
300 CAPSULE ORAL 2 TIMES DAILY
Qty: 10 CAPSULE | Refills: 0 | Status: SHIPPED | OUTPATIENT
Start: 2021-03-03 | End: 2021-03-09

## 2021-03-03 RX ORDER — SUCCINYLCHOLINE/SOD CL,ISO/PF 200MG/10ML
SYRINGE (ML) INTRAVENOUS PRN
Status: DISCONTINUED | OUTPATIENT
Start: 2021-03-03 | End: 2021-03-03 | Stop reason: SURG

## 2021-03-03 RX ORDER — MEPERIDINE HYDROCHLORIDE 25 MG/ML
6.25 INJECTION INTRAMUSCULAR; INTRAVENOUS; SUBCUTANEOUS
Status: DISCONTINUED | OUTPATIENT
Start: 2021-03-03 | End: 2021-03-03 | Stop reason: HOSPADM

## 2021-03-03 RX ORDER — OXYCODONE HCL 5 MG/5 ML
5 SOLUTION, ORAL ORAL
Status: COMPLETED | OUTPATIENT
Start: 2021-03-03 | End: 2021-03-03

## 2021-03-03 RX ORDER — HYDROMORPHONE HYDROCHLORIDE 1 MG/ML
0.4 INJECTION, SOLUTION INTRAMUSCULAR; INTRAVENOUS; SUBCUTANEOUS
Status: DISCONTINUED | OUTPATIENT
Start: 2021-03-03 | End: 2021-03-03 | Stop reason: HOSPADM

## 2021-03-03 RX ORDER — POTASSIUM CHLORIDE 7.45 MG/ML
10 INJECTION INTRAVENOUS
Status: COMPLETED | OUTPATIENT
Start: 2021-03-03 | End: 2021-03-03

## 2021-03-03 RX ORDER — POTASSIUM CHLORIDE 7.45 MG/ML
10 INJECTION INTRAVENOUS
Status: ACTIVE | OUTPATIENT
Start: 2021-03-03 | End: 2021-03-03

## 2021-03-03 RX ORDER — OXYCODONE HCL 5 MG/5 ML
10 SOLUTION, ORAL ORAL
Status: COMPLETED | OUTPATIENT
Start: 2021-03-03 | End: 2021-03-03

## 2021-03-03 RX ORDER — HALOPERIDOL 5 MG/ML
1 INJECTION INTRAMUSCULAR
Status: DISCONTINUED | OUTPATIENT
Start: 2021-03-03 | End: 2021-03-03 | Stop reason: HOSPADM

## 2021-03-03 RX ORDER — ROCURONIUM BROMIDE 10 MG/ML
INJECTION, SOLUTION INTRAVENOUS PRN
Status: DISCONTINUED | OUTPATIENT
Start: 2021-03-03 | End: 2021-03-03 | Stop reason: SURG

## 2021-03-03 RX ORDER — ONDANSETRON 2 MG/ML
4 INJECTION INTRAMUSCULAR; INTRAVENOUS EVERY 4 HOURS PRN
Status: DISCONTINUED | OUTPATIENT
Start: 2021-03-03 | End: 2021-03-04 | Stop reason: HOSPADM

## 2021-03-03 RX ORDER — ONDANSETRON 2 MG/ML
INJECTION INTRAMUSCULAR; INTRAVENOUS PRN
Status: DISCONTINUED | OUTPATIENT
Start: 2021-03-03 | End: 2021-03-03 | Stop reason: SURG

## 2021-03-03 RX ORDER — ONDANSETRON 2 MG/ML
4 INJECTION INTRAMUSCULAR; INTRAVENOUS
Status: COMPLETED | OUTPATIENT
Start: 2021-03-03 | End: 2021-03-03

## 2021-03-03 RX ORDER — BUPIVACAINE HYDROCHLORIDE AND EPINEPHRINE 5; 5 MG/ML; UG/ML
INJECTION, SOLUTION EPIDURAL; INTRACAUDAL; PERINEURAL
Status: DISCONTINUED | OUTPATIENT
Start: 2021-03-03 | End: 2021-03-03 | Stop reason: HOSPADM

## 2021-03-03 RX ORDER — HYDROMORPHONE HYDROCHLORIDE 1 MG/ML
0.2 INJECTION, SOLUTION INTRAMUSCULAR; INTRAVENOUS; SUBCUTANEOUS
Status: DISCONTINUED | OUTPATIENT
Start: 2021-03-03 | End: 2021-03-03 | Stop reason: HOSPADM

## 2021-03-03 RX ORDER — SODIUM CHLORIDE, SODIUM LACTATE, POTASSIUM CHLORIDE, CALCIUM CHLORIDE 600; 310; 30; 20 MG/100ML; MG/100ML; MG/100ML; MG/100ML
INJECTION, SOLUTION INTRAVENOUS
Status: DISCONTINUED | OUTPATIENT
Start: 2021-03-03 | End: 2021-03-03 | Stop reason: SURG

## 2021-03-03 RX ORDER — DIPHENHYDRAMINE HYDROCHLORIDE 50 MG/ML
12.5 INJECTION INTRAMUSCULAR; INTRAVENOUS
Status: DISCONTINUED | OUTPATIENT
Start: 2021-03-03 | End: 2021-03-03 | Stop reason: HOSPADM

## 2021-03-03 RX ORDER — ONDANSETRON 4 MG/1
4 TABLET, ORALLY DISINTEGRATING ORAL EVERY 6 HOURS PRN
Qty: 30 TABLET | Refills: 0 | Status: SHIPPED | OUTPATIENT
Start: 2021-03-03

## 2021-03-03 RX ORDER — METRONIDAZOLE 500 MG/1
500 TABLET ORAL 3 TIMES DAILY
Qty: 15 TABLET | Refills: 0 | Status: SHIPPED | OUTPATIENT
Start: 2021-03-03 | End: 2021-03-09

## 2021-03-03 RX ADMIN — ONDANSETRON 4 MG: 2 INJECTION INTRAMUSCULAR; INTRAVENOUS at 12:13

## 2021-03-03 RX ADMIN — FENTANYL CITRATE 50 MCG: 50 INJECTION, SOLUTION INTRAMUSCULAR; INTRAVENOUS at 10:45

## 2021-03-03 RX ADMIN — CEFTRIAXONE SODIUM 1 G: 1 INJECTION, POWDER, FOR SOLUTION INTRAMUSCULAR; INTRAVENOUS at 20:27

## 2021-03-03 RX ADMIN — ROCURONIUM BROMIDE 10 MG: 10 INJECTION, SOLUTION INTRAVENOUS at 11:04

## 2021-03-03 RX ADMIN — ROCURONIUM BROMIDE 10 MG: 10 INJECTION, SOLUTION INTRAVENOUS at 10:46

## 2021-03-03 RX ADMIN — PROPOFOL 200 MG: 10 INJECTION, EMULSION INTRAVENOUS at 10:11

## 2021-03-03 RX ADMIN — SODIUM CHLORIDE: 9 INJECTION, SOLUTION INTRAVENOUS at 08:24

## 2021-03-03 RX ADMIN — ONDANSETRON 4 MG: 2 INJECTION INTRAMUSCULAR; INTRAVENOUS at 15:30

## 2021-03-03 RX ADMIN — DOCUSATE SODIUM 50 MG AND SENNOSIDES 8.6 MG 2 TABLET: 8.6; 5 TABLET, FILM COATED ORAL at 17:29

## 2021-03-03 RX ADMIN — DEXAMETHASONE SODIUM PHOSPHATE 4 MG: 4 INJECTION, SOLUTION INTRA-ARTICULAR; INTRALESIONAL; INTRAMUSCULAR; INTRAVENOUS; SOFT TISSUE at 10:22

## 2021-03-03 RX ADMIN — METRONIDAZOLE 500 MG: 500 INJECTION, SOLUTION INTRAVENOUS at 16:58

## 2021-03-03 RX ADMIN — POTASSIUM CHLORIDE 10 MEQ: 7.46 INJECTION, SOLUTION INTRAVENOUS at 14:39

## 2021-03-03 RX ADMIN — MAGNESIUM SULFATE 2 G: 2 INJECTION INTRAVENOUS at 08:24

## 2021-03-03 RX ADMIN — METRONIDAZOLE 500 MG: 500 INJECTION, SOLUTION INTRAVENOUS at 05:24

## 2021-03-03 RX ADMIN — FENTANYL CITRATE 50 MCG: 50 INJECTION, SOLUTION INTRAMUSCULAR; INTRAVENOUS at 12:11

## 2021-03-03 RX ADMIN — SUGAMMADEX 200 MG: 100 INJECTION, SOLUTION INTRAVENOUS at 11:43

## 2021-03-03 RX ADMIN — METRONIDAZOLE 500 MG: 500 INJECTION, SOLUTION INTRAVENOUS at 23:01

## 2021-03-03 RX ADMIN — POTASSIUM CHLORIDE 10 MEQ: 7.46 INJECTION, SOLUTION INTRAVENOUS at 15:53

## 2021-03-03 RX ADMIN — FENTANYL CITRATE 100 MCG: 50 INJECTION, SOLUTION INTRAMUSCULAR; INTRAVENOUS at 10:11

## 2021-03-03 RX ADMIN — FENTANYL CITRATE 100 MCG: 50 INJECTION, SOLUTION INTRAMUSCULAR; INTRAVENOUS at 10:31

## 2021-03-03 RX ADMIN — CEFOTETAN DISODIUM 2 G: 2 INJECTION, POWDER, FOR SOLUTION INTRAMUSCULAR; INTRAVENOUS at 10:13

## 2021-03-03 RX ADMIN — OXYCODONE HYDROCHLORIDE 10 MG: 5 SOLUTION ORAL at 12:11

## 2021-03-03 RX ADMIN — ROCURONIUM BROMIDE 10 MG: 10 INJECTION, SOLUTION INTRAVENOUS at 10:11

## 2021-03-03 RX ADMIN — SODIUM CHLORIDE, POTASSIUM CHLORIDE, SODIUM LACTATE AND CALCIUM CHLORIDE: 600; 310; 30; 20 INJECTION, SOLUTION INTRAVENOUS at 10:10

## 2021-03-03 RX ADMIN — OXYCODONE 5 MG: 5 TABLET ORAL at 15:30

## 2021-03-03 RX ADMIN — LIDOCAINE HYDROCHLORIDE 4 ML: 40 SOLUTION TOPICAL at 10:12

## 2021-03-03 RX ADMIN — FENTANYL CITRATE 50 MCG: 50 INJECTION, SOLUTION INTRAMUSCULAR; INTRAVENOUS at 12:29

## 2021-03-03 RX ADMIN — ONDANSETRON 4 MG: 2 INJECTION INTRAMUSCULAR; INTRAVENOUS at 10:22

## 2021-03-03 RX ADMIN — Medication 100 MG: at 10:11

## 2021-03-03 RX ADMIN — GLYCOPYRROLATE 0.4 MG: 0.2 INJECTION INTRAMUSCULAR; INTRAVENOUS at 10:16

## 2021-03-03 RX ADMIN — ROCURONIUM BROMIDE 20 MG: 10 INJECTION, SOLUTION INTRAVENOUS at 10:15

## 2021-03-03 ASSESSMENT — ENCOUNTER SYMPTOMS
SENSORY CHANGE: 0
ABDOMINAL PAIN: 0
COUGH: 0
NECK PAIN: 0
SPEECH CHANGE: 0
NAUSEA: 0
CHILLS: 0
VOMITING: 0
BACK PAIN: 0
WEAKNESS: 0
DIARRHEA: 0
FLANK PAIN: 0
MYALGIAS: 0
NERVOUS/ANXIOUS: 0
HEARTBURN: 0
PALPITATIONS: 0
SORE THROAT: 0
FOCAL WEAKNESS: 0
WHEEZING: 0
DIZZINESS: 0
DIAPHORESIS: 0
SHORTNESS OF BREATH: 0
HEADACHES: 0
BLURRED VISION: 0
FEVER: 0

## 2021-03-03 ASSESSMENT — PAIN DESCRIPTION - PAIN TYPE
TYPE: ACUTE PAIN;SURGICAL PAIN
TYPE: ACUTE PAIN;SURGICAL PAIN
TYPE: SURGICAL PAIN
TYPE: SURGICAL PAIN
TYPE: ACUTE PAIN
TYPE: SURGICAL PAIN

## 2021-03-03 ASSESSMENT — PAIN SCALES - GENERAL
PAIN_LEVEL: 4
PAIN_LEVEL: 0

## 2021-03-03 NOTE — PROGRESS NOTES
Pt A&O: 4  Nausea: Denies , Vomiting: Denies  Voids: per bathroom, BM: 2/28/21  Pain: Denies  Tolerating Diet: NPO for procedure.  Updated on plan of care.  No needs at this time.  Call light within reach, will continue to monitor.

## 2021-03-03 NOTE — OR NURSING
Pt recovered well. Had some pain and nausea that was managed with medication. Pt is A&Ox4, VSS, 4 lap sites are CDI with ice pack in place. Pt has no belongings in PACU.  was called and updated on pt status and plan to return to room shortly. Pt has been resting comfortably in bed in no acute distress, able to make needs known.    Pt to be transported to room on 2L O2 via NC. Tank on bed is full. Floor nurse aware of all orders, no questions at time of phone report.

## 2021-03-03 NOTE — OP REPORT
DATE OF OPERATION: 3/3/2021    PREOPERATIVE DIAGNOSIS: Biliary Pancreatitis    POSTOPERATIVE DIAGNOSIS: Same.    PROCEDURE PERFORMED: Laparoscopic cholecystectomy.     SURGEON: Sanket Orozco MD    ANESTHESIOLOGIST:  Martínez Gutierrez MD..    ANESTHESIA: General endotracheal anesthesia.    FINDINGS: The gallbladder showed signs of marked acute on chronic inflammation. Ectatic cystic duct.  Adherent adjacent omentum.      SPECIMEN: Gallbladder with contents.    ESTIMATED BLOOD LOSS: 20 mL.    PROCEDURE: Informed consent was obtained pre-operatively.  The patient was taken back to the operating room and placed in supine position.  General endotracheal anesthesia was administered and the patient was intubated. Intravenous antibiotics were administered by the anesthesiologist in correct time interval. Sequential compression devices were placed. The abdomen was prepped and draped in a sterile fashion.  A time-out was performed and the patient and procedure were both verified.      Marcaine 0.5% with epinephrine was used to infiltrate the port sites. A 5 mm lily-umbilical incision was made and subcutaneous tissue spread bluntly. The umbilical stalk was grasped with a Kocher and elevated toward the ceiling.  A Veress needle was atraumatically inserted into the peritoneal space. Saline test was performed and supported proper intra-peritoneal placement.  Carbon dioxide gas was applied to the port and pneumoperitoneum was achieved.  The Veress needle was removed after adequate insufflation.  A 5 mm port was placed into the intra-peritoneal space. A laparoscope was placed through this port.  The abdominal contents were inspected noted to be free of injury from Veress needle and port placement.  A 12 mm and two 5 mm ports were then placed in the epigastric region, right subcostal, and right lateral locations under directed visualization, respectively.      The gallbladder was identified, elevated, and retracted cephalad over the  liver edge by the fundus to expose the infundibulum.  The omentum was swept off the fundus and body of the gallbladder using blunt dissection and electrocautery.  Bleeding was controlled with surgical clips were needed. Dissection was carried out within the hepatocystic triangle, definitively identifying the cystic duct and cystic artery, which had anterior and posterior branches. The cystic duct could be seen clearly terminating at the infundibulum.  The critical view of safety was achieved.      The cystic artery branches were each clipped once proximally, distally, and divided. The cystic duct was divided with a single firing from an [facility] SHIREEN 45mm stapler with a blue load.  The gallbladder was dissected free from the undersurface of the liver using electrocautery and placed within an EndoCatch bag. The gallbladder was delivered intact from the abdominal cavity through the epigastric port site and submitted for pathology. All dissected surfaces were covered with a 3gm Livia spry.  The gallbladder fossa was inspected and hemostasis was noted to be satisfactory.     The epigastric port site fascia was closed with a 0 Vicryl suture using a suture passer. Once tied down, the fascia was noted to be tight and well approximated.    The ports were opened and the abdomen was allowed to deflate. All ports were then removed.  The port site skin incisions were closed with interrupted 4-0 Vicryl subcuticular sutures.    The patient tolerated the procedure well and there were no apparent complications. All sponge, sharps, and instrument counts were correct on 2 separate occasions. The patient was awakened, extubated, and transferred to the PACU in satisfactory condition.               [facility] Post-Operative Data:    Emergency Case?----- No   Wound Classification?----- Contaminated  Wound Closure?----- No Layers Closed/Left Open  ASA Classification?----- II    ____________________________________   Sanket VALVERDE /  HANNA     DD: 3/3/2021   DT: 11:48 AM

## 2021-03-03 NOTE — ANESTHESIA POSTPROCEDURE EVALUATION
Patient: Caridad Higginbotham    Procedure Summary     Date: 03/03/21 Room / Location: [place] [id number] / SURGERY [facility]    Anesthesia Start: 1010 Anesthesia Stop: 1152    Procedure: CHOLECYSTECTOMY, LAPAROSCOPIC (N/A Abdomen) Diagnosis: (gall stone pancreatitis)    Surgeons: Norman Orozco M.D. Responsible Provider: Martínez Gutierrez M.D.    Anesthesia Type: general ASA Status: 2          Final Anesthesia Type: general  Last vitals  BP   Blood Pressure: 126/59    Temp   36.2 °C (97.2 °F)    Pulse   (!) 47   Resp   14    SpO2   97 %      Anesthesia Post Evaluation    Patient location during evaluation: PACU  Patient participation: complete - patient participated  Level of consciousness: awake and alert  Pain score: 0    Airway patency: patent  Anesthetic complications: no  Cardiovascular status: hemodynamically stable  Respiratory status: acceptable  Hydration status: euvolemic    PONV: none          No complications documented.     Nurse Pain Score: 0 (NPRS)

## 2021-03-03 NOTE — PROGRESS NOTES
Assumed care of patient at bedside report from DAY RN. Updated on POC. Patient currently A & O x 4; on room air. up ad korey; without complaints of acute pain. Family at bedside. Call light within reach. Whiteboard updated. Fall precautions in place. Bed locked and in lowest position. All questions answered. No other needs indicated at this time.

## 2021-03-03 NOTE — ANESTHESIA PREPROCEDURE EVALUATION
"    Relevant Problems   No relevant active problems     /59   Pulse (!) 47   Temp 36.2 °C (97.2 °F) (Temporal)   Resp 14   Ht 1.5 m (4' 11.06\")   Wt 95.1 kg (209 lb 10.5 oz)   SpO2 97%   BMI 42.27 kg/m²     Physical Exam    Airway   Mallampati: II  TM distance: >3 FB  Neck ROM: full       Cardiovascular - normal exam  Rhythm: regular  Rate: normal  (-) murmur     Dental - normal exam           Pulmonary - normal exam  Breath sounds clear to auscultation     Abdominal    Neurological - normal exam                 Anesthesia Plan    ASA 2       Plan - general       Airway plan will be ETT          Induction: intravenous    Postoperative Plan: Postoperative administration of opioids is intended.    Pertinent diagnostic labs and testing reviewed    Informed Consent:    Anesthetic plan and risks discussed with patient.    Use of blood products discussed with: patient whom consented to blood products.         "

## 2021-03-03 NOTE — ANESTHESIA POSTPROCEDURE EVALUATION
Patient: Caridad Higginbotham    Procedure Summary     Date: 03/02/21 Room / Location: [facility] ROOM 26 / SURGERY SAME DAY [facility]    Anesthesia Start: 1256 Anesthesia Stop: 1329    Procedure: ERCP (ENDOSCOPIC RETROGRADE CHOLANGIOPANCREATOGRAPHY) (N/A Esophagus) Diagnosis: (Choledocholithiasis)    Surgeons: Addi Gilmore M.D. Responsible Provider: Navin Ko M.D.    Anesthesia Type: general ASA Status: 3          Final Anesthesia Type: general  Last vitals  BP   Blood Pressure: 146/62    Temp   36.2 °C (97.2 °F)    Pulse   (!) 56   Resp   16    SpO2   94 %      Anesthesia Post Evaluation    Patient location during evaluation: PACU  Patient participation: complete - patient participated  Level of consciousness: awake and alert  Pain score: 4    Airway patency: patent  Anesthetic complications: no  Cardiovascular status: hemodynamically stable  Respiratory status: acceptable  Hydration status: euvolemic    PONV: none          No complications documented.     Nurse Pain Score: 4 (NPRS)

## 2021-03-03 NOTE — PROGRESS NOTES
Bedside report received. Assessment completed.  Ipad  services used.  Pt is A&O x4. Pt on room air.   Medicating for pain PRN per MAR Pain 0/10  Denies nausea.   - numbness, - tingling.  Skin intact          Last BM PTA . +flatus,   +void.  NPO at this time for procedure today.  Pt up self.  Call light and belongings within reach. All needs met at this time. Fall Precautions and hourly rounding in place.

## 2021-03-03 NOTE — ANESTHESIA TIME REPORT
Anesthesia Start and Stop Event Times     Date Time Event    3/3/2021 0951 Ready for Procedure     1010 Anesthesia Start     1152 Anesthesia Stop        Responsible Staff  03/03/21    Name Role Begin End    Martínez Gutierrez M.D. Anesth 1010 1152        Preop Diagnosis (Free Text):  Pre-op Diagnosis     gall stone pancreatitis        Preop Diagnosis (Codes):    Post op Diagnosis  Gallstone pancreatitis      Premium Reason  Non-Premium    Comments:

## 2021-03-03 NOTE — ANESTHESIA PROCEDURE NOTES
Airway    Date/Time: 3/3/2021 10:12 AM  Performed by: Martínez Gutierrez M.D.  Authorized by: Martínez Gutierrez M.D.     Location:  OR  Urgency:  Elective  Difficult Airway: No    Indications for Airway Management:  Anesthesia      Spontaneous Ventilation: absent    Sedation Level:  Deep  Preoxygenated: Yes    Patient Position:  Sniffing  Mask Difficulty Assessment:  0 - not attempted  Final Airway Type:  Endotracheal airway  Final Endotracheal Airway:  ETT  Cuffed: Yes    Technique Used for Successful ETT Placement:  Direct laryngoscopy    Insertion Site:  Oral  Blade Type:  Ashlyn  Laryngoscope Blade/Videolaryngoscope Blade Size:  3  ETT Size (mm):  7.0  Measured from:  Teeth  ETT to Teeth (cm):  21  Placement Verified by: auscultation and capnometry    Cormack-Lehane Classification:  Grade I - full view of glottis  Number of Attempts at Approach:  1

## 2021-03-03 NOTE — PROGRESS NOTES
[facility] Medicine Daily Progress Note    Date of Service  3/3/2021    Chief Complaint  55 y.o. female admitted 2/28/2021 with gallstone pancreatitis.    Hospital Course  Admitted with gallstone pancreatitis, cholelithiasis and cholecystitis, choledocholithiasis.  Surgery and gastroenterology were consulted on the case.    Interval Problem Update  Choledocholithiasis -underwent ERCP yesterday  Cholecystitis - discussed case with Surgery, plan for laparoscopic cholecystectomy today  Low potassium and magnesium    A qualified  was used to interpret [place] during this encounter.  ’s name/ID number was [id number] and mode of interpretation was iPad. The content of the interpretation included Patient Education, Medications, History/Visit information.    Lengthy discussion with patient's niece who was present at bedside.  Updates given, plan of care discussed.    Consultants/Specialty  Surgery  Gastroenterology    Code Status  Full Code    Disposition  TBD    Review of Systems  Review of Systems   Constitutional: Negative for chills, diaphoresis, fever and malaise/fatigue.   HENT: Negative for congestion, hearing loss and sore throat.    Eyes: Negative for blurred vision.   Respiratory: Negative for cough, shortness of breath and wheezing.    Cardiovascular: Negative for chest pain, palpitations and leg swelling.   Gastrointestinal: Negative for abdominal pain, diarrhea, heartburn, nausea and vomiting.   Genitourinary: Negative for dysuria, flank pain and hematuria.   Musculoskeletal: Negative for back pain, joint pain, myalgias and neck pain.   Skin: Negative for rash.   Neurological: Negative for dizziness, sensory change, speech change, focal weakness, weakness and headaches.   Psychiatric/Behavioral: The patient is not nervous/anxious.         Physical Exam  Temp:  [36.2 °C (97.2 °F)-36.9 °C (98.4 °F)] 36.2 °C (97.2 °F)  Pulse:  [47-71] 47  Resp:  [12-18] 14  BP: ()/(45-84)  126/59  SpO2:  [93 %-100 %] 97 %    Physical Exam  Vitals and nursing note reviewed.   Constitutional:       Appearance: She is obese.   HENT:      Head: Normocephalic and atraumatic.      Nose: No congestion.      Mouth/Throat:      Mouth: Mucous membranes are moist.   Eyes:      Extraocular Movements: Extraocular movements intact.      Conjunctiva/sclera: Conjunctivae normal.      Pupils: Pupils are equal, round, and reactive to light.   Cardiovascular:      Rate and Rhythm: Normal rate and regular rhythm.   Pulmonary:      Effort: Pulmonary effort is normal.      Breath sounds: Normal breath sounds.   Abdominal:      General: Bowel sounds are normal. There is no distension.      Palpations: Abdomen is soft.      Tenderness: There is no abdominal tenderness. There is no guarding or rebound.   Musculoskeletal:      Cervical back: No tenderness.      Right lower leg: No edema.      Left lower leg: No edema.   Skin:     General: Skin is warm and dry.   Neurological:      General: No focal deficit present.      Mental Status: She is alert and oriented to person, place, and time.      Cranial Nerves: No cranial nerve deficit.         Fluids    Intake/Output Summary (Last 24 hours) at 3/3/2021 1126  Last data filed at 3/2/2021 2000  Gross per 24 hour   Intake 752 ml   Output 20 ml   Net 732 ml       Laboratory  Recent Labs     03/01/21  0625 03/02/21  0334 03/03/21  0232   WBC 4.5* 5.6 5.4   RBC 4.47 4.15* 4.01*   HEMOGLOBIN 12.9 12.0 11.9*   HEMATOCRIT 42.0 39.1 37.5   MCV 94.0 94.2 93.5   MCH 28.9 28.9 29.7   MCHC 30.7* 30.7* 31.7*   RDW 49.0 49.8 51.4*   PLATELETCT 205 212 176   MPV 9.9 10.4 9.9     Recent Labs     03/01/21  0625 03/02/21  0334 03/03/21  0232   SODIUM 140 143 138   POTASSIUM 3.5* 3.6 3.3*   CHLORIDE 109 112 107   CO2 20 20 19*   GLUCOSE 79 82 98   BUN 9 8 6*   CREATININE 0.45* 0.65 0.54   CALCIUM 8.9 8.7 8.9     Recent Labs     03/02/21  0334   INR 1.10               Imaging  [id number] STONE  REMOVAL   Final Result      Fluoroscopic image(s) obtained during ERCP and biliary stone removal. Please see the patient's chart for full procedural details.      Fluoroscopy time 19 seconds.         [id number]-T/O   Final Result      1.  Distal common bile duct stone measuring 5 mm with extrahepatic biliary dilation.   2.  Cholelithiasis and gallbladder wall thickening suggesting acute cholecystitis.      US-RUQ   Final Result      1.  There is cholelithiasis with gallbladder sludge, mild wall thickening and biliary dilatation. In the appropriate clinical setting this could represent acute cholecystitis.              Assessment/Plan  * Acute gallstone pancreatitis- (present on admission)  Assessment & Plan  IVF hydration  Pain control    Cholecystitis- (present on admission)  Assessment & Plan  IV Rocephin and Flagyl  for laparoscopic cholecystectomy today    Choledocholithiasis - (present on admission)  Assessment & Plan  ERCP - biliary sphincterotomy performed with balloon common bile duct stones x10 extraction and pus, multiple yellowish 3 mm to 1cm stones removed. 3/2/2021  IV Rocephin and Flagyl    Hypomagnesemia- (present on admission)  Assessment & Plan  IV Mg 2 g  Follow level    Hypokalemia- (present on admission)  Assessment & Plan  IV KCL, follow bmp      Class 3 severe obesity due to excess calories with serious comorbidity and body mass index (BMI) of 40.0 to 44.9 in adult (HCC)- (present on admission)  Assessment & Plan  Body mass index is 42.27 kg/m².       VTE prophylaxis: Heparin

## 2021-03-03 NOTE — NON-PROVIDER
This note is intended for the purposes of medical student education and feedback only.   Please refer to the documentation by this patient's assigned medical practitioner for details of care and plans.    Reason for admission: Shaun Higginbotham is a 55 y.o. female who was admitted on 2/28 for gallstone pancreatitis    HD/POD#: HD 3. Abnormal MRCP on 3/1. Pt had an ERCP with Dr. Gilmore on 3/2 which revealed calculus of bile duct with acute cholangitis without obstruction    SUBJECTIVE  Per nursing, pt had no overnight events. She did a few episodes of bradycardia, but was asymptomatic.    This morning the pt feels well. She has no N/V or abdominal pain. She states that she has not had a BM since Sunday, but believes that is because she has not eaten solid food since then. She states that her ERCP went much better than expected yesterday.      was used throughout this encounter    OBJECTIVE   Vital Signs:  • Max 24 hour temp:98.4  • Current temp:97.4  • Current HR:71 (some pushpa yesterday 40's and 50's)  • Current BP:121/84  • Current RR:18  • Current O2 Sat: 97% RA    Physical Exam:  General: NAD  HEENT: PERRLA. EOMI. Mild scleral icterus noted.  Cardiovascular: RRR. No murmurs, rubs or gallops  Respiratory: CTAB  Abdominal exam: Soft with mild tenderness to the RUQ and RLQ. There is no rebound or guarding. BS+ in all 4 quadrants.  Extremities: No edema  Neurological: AxOx3. CN II-XII grossly intact. No focal neuro deficits    Ins/Outs:  • P/O Intake:360  • IV Intake:1512  • Urine output: not recorded  • Drain output:N/A  • Other: Blood 20      Lab Results:  Recent Labs     03/01/21  0625 03/02/21  0334 03/03/21  0232   WBC 4.5* 5.6 5.4   RBC 4.47 4.15* 4.01*   HEMOGLOBIN 12.9 12.0 11.9*   HEMATOCRIT 42.0 39.1 37.5   MCV 94.0 94.2 93.5   MCH 28.9 28.9 29.7   MCHC 30.7* 30.7* 31.7*   RDW 49.0 49.8 51.4*   PLATELETCT 205 212 176   MPV 9.9 10.4 9.9     Recent Labs     03/01/21  0625 03/02/21  0334  03/03/21  0232   SODIUM 140 143 138   POTASSIUM 3.5* 3.6 3.3*   CHLORIDE 109 112 107   CO2 20 20 19*   GLUCOSE 79 82 98   BUN 9 8 6*   CREATININE 0.45* 0.65 0.54   CALCIUM 8.9 8.7 8.9     Recent Labs     03/02/21  0334   INR 1.10     Recent Labs     03/01/21  0625 03/02/21  0334 03/03/21  0232   ASTSGOT 290* 172* 135*   ALTSGPT 200* 161* 143*   TBILIRUBIN 2.7* 1.6* 1.0   ALKPHOSPHAT 342* 364* 386*   GLOBULIN 3.4 3.0 3.2   INR  --  1.10  --        Imaging Results:  [id number] STONE REMOVAL   Final Result      Fluoroscopic image(s) obtained during ERCP and biliary stone removal. Please see the patient's chart for full procedural details.      Fluoroscopy time 19 seconds.         [id number]-M/O   Final Result      1.  Distal common bile duct stone measuring 5 mm with extrahepatic biliary dilation.   2.  Cholelithiasis and gallbladder wall thickening suggesting acute cholecystitis.      US-RUQ   Final Result      1.  There is cholelithiasis with gallbladder sludge, mild wall thickening and biliary dilatation. In the appropriate clinical setting this could represent acute cholecystitis.               ASSESSMENT/PLAN  Pt with complaints of RUQ pain since 2/28. US revealaed cholelithiasis with gallbladder sludge and mild biliary dilation. Abnormal MRCP on 3/1. ERCP with stone extraction and biliary sphinectomy performed on 3/2 with Dr. Gilmore. Lipase today is 40.    #Gallstone pancreatitis  -To OR today for laprascopic cholecystectomy      PROPHYLAXIS  • DVT: Heparin  • GI: Senna-docusate, polyethylene glycol, magnesium hydroxide, bisacodyl

## 2021-03-03 NOTE — CARE PLAN
Problem: Safety  Goal: Will remain free from injury  Outcome: PROGRESSING AS EXPECTED  Goal: Will remain free from falls  Outcome: PROGRESSING AS EXPECTED  Note: Pt remains free of injury during hospital stay. Continue to apply appropriate fall precautions based on risk and educate patient on general falls precautions, use of call light.       Problem: Bowel/Gastric:  Goal: Normal bowel function is maintained or improved  Outcome: PROGRESSING AS EXPECTED  Note: Patient denies any nausea/vomiting, denies tenderness. COntinue to monitor  Goal: Will not experience complications related to bowel motility  Outcome: PROGRESSING AS EXPECTED     Problem: Knowledge Deficit  Goal: Knowledge of disease process/condition, treatment plan, diagnostic tests, and medications will improve  Outcome: PROGRESSING AS EXPECTED  Note: Assess patient knowledge level regarding current hospitalization, diagnosis, plan of care and medications. Answer pt questions, educate as needed. Used  for [place] translation, Educated about NPO post midnight and assessment performed.   Goal: Knowledge of the prescribed therapeutic regimen will improve  Outcome: PROGRESSING AS EXPECTED     Problem: Pain Management  Goal: Pain level will decrease to patient's comfort goal  Outcome: PROGRESSING AS EXPECTED  Note: Assess pain level. Administer appropriate pain medication as ordered, reassess response. Continue to educate patient on purpose, expectations, non-pharmacologic ways to decrease pain. Pt reports pain is currently under control.

## 2021-03-03 NOTE — PROGRESS NOTES
"    DATE: 3/3/2021    HD 3: Gallstone pancreatitis    Interval Events:  Labs unchanged  Evaluated at bedside at 0850 this morning    Given the above presentation, the patient will be taken to the operating room for laparoscopic cholecystectomy on 3/3. The surgical plan rationale for surgery was discussed in detail and in layman's terms with the patient and available family. Potential complications were discussed in detail and include but are not limited to infection, bleeding, damage to adjacent tissues and organs, pneumonia, anesthetic complications and death on very rare occasions. Questions and concerns were addressed to the patient's and available family's apparent satisfaction.  It was agreed to proceed.     Language Line was used through out this interaction.    PHYSICAL EXAMINATION:  Constitutional:     Vital Signs: /57   Pulse (!) 57   Temp 36.6 °C (97.9 °F) (Temporal)   Resp 18   Ht 1.5 m (4' 11.06\")   Wt 95.1 kg (209 lb 10.5 oz)   SpO2 95%    GENERAL:  No acute distress  HEENT: Pupils equal, round and reactive to light bilaterally.  Sclera are clear bilaterally.  No otorrhea.  No rhinorrhea.  Mucus membranes are moist.  CHEST:  Lungs are clear to auscultation bilaterally  CARDIOVASCULAR:  Regular rate and rhythm  ABDOMEN: Soft, mildly tender over the RUQ, non-distended  GENITOURINARY:  No encarnacion in place, voiding without issues  EXTREMITIES:  Good range of motion through out  NEUROLOGIC:  Alert and oriented.  Cranial Nerves II through XII are grossly intact, no focal deficits appreciated  PSYCHIATRIC:  Normal affect and mood appropriate for age and condition  SKIN:  Warm and well perfused, no rashes    Laboratory Values:   Recent Labs     03/01/21  0625 03/02/21  0334 03/03/21  0232   WBC 4.5* 5.6 5.4   RBC 4.47 4.15* 4.01*   HEMOGLOBIN 12.9 12.0 11.9*   HEMATOCRIT 42.0 39.1 37.5   MCV 94.0 94.2 93.5   MCH 28.9 28.9 29.7   MCHC 30.7* 30.7* 31.7*   RDW 49.0 49.8 51.4*   PLATELETCT 205 212 176 "   MPV 9.9 10.4 9.9     Recent Labs     03/01/21  0625 03/02/21  0334 03/03/21  0232   SODIUM 140 143 138   POTASSIUM 3.5* 3.6 3.3*   CHLORIDE 109 112 107   CO2 20 20 19*   GLUCOSE 79 82 98   BUN 9 8 6*   CREATININE 0.45* 0.65 0.54   CALCIUM 8.9 8.7 8.9     Recent Labs     03/01/21  0625 03/02/21  0334 03/03/21  0232   ASTSGOT 290* 172* 135*   ALTSGPT 200* 161* 143*   TBILIRUBIN 2.7* 1.6* 1.0   ALKPHOSPHAT 342* 364* 386*   GLOBULIN 3.4 3.0 3.2   INR  --  1.10  --      Recent Labs     03/02/21 0334   INR 1.10        Imaging:   [id number] STONE REMOVAL   Final Result      Fluoroscopic image(s) obtained during ERCP and biliary stone removal. Please see the patient's chart for full procedural details.      Fluoroscopy time 19 seconds.         [id number]-A/O   Final Result      1.  Distal common bile duct stone measuring 5 mm with extrahepatic biliary dilation.   2.  Cholelithiasis and gallbladder wall thickening suggesting acute cholecystitis.      US-RUQ   Final Result      1.  There is cholelithiasis with gallbladder sludge, mild wall thickening and biliary dilatation. In the appropriate clinical setting this could represent acute cholecystitis.             ASSESSMENT AND PLAN:     * Acute gallstone pancreatitis- (present on admission)  Assessment & Plan  Confirmed on MRCP  3/2 ERCP with stone extraction PENDING  3/3 Lap Amber PENDING       ____________________________________     Norman Orozco M.D.

## 2021-03-04 ENCOUNTER — PHARMACY VISIT (OUTPATIENT)
Dept: PHARMACY | Facility: MEDICAL CENTER | Age: 56
End: 2021-03-04
Payer: COMMERCIAL

## 2021-03-04 VITALS
SYSTOLIC BLOOD PRESSURE: 106 MMHG | RESPIRATION RATE: 18 BRPM | DIASTOLIC BLOOD PRESSURE: 55 MMHG | HEART RATE: 63 BPM | OXYGEN SATURATION: 91 % | BODY MASS INDEX: 42.27 KG/M2 | HEIGHT: 59 IN | WEIGHT: 209.66 LBS | TEMPERATURE: 98.3 F

## 2021-03-04 LAB
ALBUMIN SERPL BCP-MCNC: 3 G/DL (ref 3.2–4.9)
ALBUMIN/GLOB SERPL: 0.9 G/DL
ALP SERPL-CCNC: 375 U/L (ref 30–99)
ALT SERPL-CCNC: 127 U/L (ref 2–50)
ANION GAP SERPL CALC-SCNC: 12 MMOL/L (ref 7–16)
AST SERPL-CCNC: 120 U/L (ref 12–45)
BASOPHILS # BLD AUTO: 0.4 % (ref 0–1.8)
BASOPHILS # BLD: 0.03 K/UL (ref 0–0.12)
BILIRUB SERPL-MCNC: 0.7 MG/DL (ref 0.1–1.5)
BUN SERPL-MCNC: 5 MG/DL (ref 8–22)
CALCIUM SERPL-MCNC: 8.6 MG/DL (ref 8.5–10.5)
CHLORIDE SERPL-SCNC: 106 MMOL/L (ref 96–112)
CO2 SERPL-SCNC: 23 MMOL/L (ref 20–33)
CREAT SERPL-MCNC: 0.44 MG/DL (ref 0.5–1.4)
EOSINOPHIL # BLD AUTO: 0 K/UL (ref 0–0.51)
EOSINOPHIL NFR BLD: 0 % (ref 0–6.9)
ERYTHROCYTE [DISTWIDTH] IN BLOOD BY AUTOMATED COUNT: 51.7 FL (ref 35.9–50)
GLOBULIN SER CALC-MCNC: 3.3 G/DL (ref 1.9–3.5)
GLUCOSE SERPL-MCNC: 106 MG/DL (ref 65–99)
HCT VFR BLD AUTO: 37.8 % (ref 37–47)
HGB BLD-MCNC: 12.1 G/DL (ref 12–16)
IMM GRANULOCYTES # BLD AUTO: 0.04 K/UL (ref 0–0.11)
IMM GRANULOCYTES NFR BLD AUTO: 0.5 % (ref 0–0.9)
LIPASE SERPL-CCNC: 26 U/L (ref 11–82)
LYMPHOCYTES # BLD AUTO: 1.63 K/UL (ref 1–4.8)
LYMPHOCYTES NFR BLD: 19.7 % (ref 22–41)
MAGNESIUM SERPL-MCNC: 2 MG/DL (ref 1.5–2.5)
MCH RBC QN AUTO: 29.9 PG (ref 27–33)
MCHC RBC AUTO-ENTMCNC: 32 G/DL (ref 33.6–35)
MCV RBC AUTO: 93.3 FL (ref 81.4–97.8)
MONOCYTES # BLD AUTO: 0.41 K/UL (ref 0–0.85)
MONOCYTES NFR BLD AUTO: 5 % (ref 0–13.4)
NEUTROPHILS # BLD AUTO: 6.16 K/UL (ref 2–7.15)
NEUTROPHILS NFR BLD: 74.4 % (ref 44–72)
NRBC # BLD AUTO: 0 K/UL
NRBC BLD-RTO: 0 /100 WBC
PLATELET # BLD AUTO: 184 K/UL (ref 164–446)
PMV BLD AUTO: 10.1 FL (ref 9–12.9)
POTASSIUM SERPL-SCNC: 3.4 MMOL/L (ref 3.6–5.5)
PROT SERPL-MCNC: 6.3 G/DL (ref 6–8.2)
RBC # BLD AUTO: 4.05 M/UL (ref 4.2–5.4)
SODIUM SERPL-SCNC: 141 MMOL/L (ref 135–145)
WBC # BLD AUTO: 8.3 K/UL (ref 4.8–10.8)

## 2021-03-04 PROCEDURE — A9270 NON-COVERED ITEM OR SERVICE: HCPCS | Performed by: INTERNAL MEDICINE

## 2021-03-04 PROCEDURE — 83690 ASSAY OF LIPASE: CPT

## 2021-03-04 PROCEDURE — 83735 ASSAY OF MAGNESIUM: CPT

## 2021-03-04 PROCEDURE — 85025 COMPLETE CBC W/AUTO DIFF WBC: CPT

## 2021-03-04 PROCEDURE — 700101 HCHG RX REV CODE 250: Performed by: FAMILY MEDICINE

## 2021-03-04 PROCEDURE — 700105 HCHG RX REV CODE 258: Performed by: NURSE PRACTITIONER

## 2021-03-04 PROCEDURE — 80053 COMPREHEN METABOLIC PANEL: CPT

## 2021-03-04 PROCEDURE — 94760 N-INVAS EAR/PLS OXIMETRY 1: CPT

## 2021-03-04 PROCEDURE — 700101 HCHG RX REV CODE 250: Performed by: SURGERY

## 2021-03-04 PROCEDURE — 700102 HCHG RX REV CODE 250 W/ 637 OVERRIDE(OP): Performed by: INTERNAL MEDICINE

## 2021-03-04 PROCEDURE — 99239 HOSP IP/OBS DSCHRG MGMT >30: CPT | Performed by: FAMILY MEDICINE

## 2021-03-04 PROCEDURE — 700111 HCHG RX REV CODE 636 W/ 250 OVERRIDE (IP): Performed by: SURGERY

## 2021-03-04 RX ORDER — AMOXICILLIN 250 MG
2 CAPSULE ORAL 2 TIMES DAILY
Status: DISCONTINUED | OUTPATIENT
Start: 2021-03-04 | End: 2021-03-04 | Stop reason: HOSPADM

## 2021-03-04 RX ORDER — OXYCODONE HYDROCHLORIDE 5 MG/1
5 TABLET ORAL EVERY 6 HOURS PRN
Qty: 20 TABLET | Refills: 0 | Status: SHIPPED | OUTPATIENT
Start: 2021-03-04 | End: 2021-03-09

## 2021-03-04 RX ORDER — POLYETHYLENE GLYCOL 3350 17 G/17G
1 POWDER, FOR SOLUTION ORAL 2 TIMES DAILY
Status: DISCONTINUED | OUTPATIENT
Start: 2021-03-04 | End: 2021-03-04 | Stop reason: HOSPADM

## 2021-03-04 RX ORDER — KETOROLAC TROMETHAMINE 30 MG/ML
30 INJECTION, SOLUTION INTRAMUSCULAR; INTRAVENOUS ONCE
Status: COMPLETED | OUTPATIENT
Start: 2021-03-04 | End: 2021-03-04

## 2021-03-04 RX ORDER — OXYCODONE HYDROCHLORIDE 5 MG/1
2.5 TABLET ORAL
Status: DISCONTINUED | OUTPATIENT
Start: 2021-03-04 | End: 2021-03-04 | Stop reason: HOSPADM

## 2021-03-04 RX ORDER — BISACODYL 10 MG
10 SUPPOSITORY, RECTAL RECTAL
Status: DISCONTINUED | OUTPATIENT
Start: 2021-03-04 | End: 2021-03-04 | Stop reason: HOSPADM

## 2021-03-04 RX ORDER — OXYCODONE HYDROCHLORIDE 5 MG/1
5 TABLET ORAL
Status: DISCONTINUED | OUTPATIENT
Start: 2021-03-04 | End: 2021-03-04 | Stop reason: HOSPADM

## 2021-03-04 RX ADMIN — KETOROLAC TROMETHAMINE 30 MG: 30 INJECTION, SOLUTION INTRAMUSCULAR; INTRAVENOUS at 10:13

## 2021-03-04 RX ADMIN — OXYCODONE 5 MG: 5 TABLET ORAL at 00:24

## 2021-03-04 RX ADMIN — OXYCODONE 5 MG: 5 TABLET ORAL at 08:32

## 2021-03-04 RX ADMIN — DOCUSATE SODIUM 50 MG AND SENNOSIDES 8.6 MG 2 TABLET: 8.6; 5 TABLET, FILM COATED ORAL at 05:22

## 2021-03-04 RX ADMIN — POLYETHYLENE GLYCOL 3350 1 PACKET: 17 POWDER, FOR SOLUTION ORAL at 10:13

## 2021-03-04 RX ADMIN — METRONIDAZOLE 500 MG: 500 INJECTION, SOLUTION INTRAVENOUS at 05:23

## 2021-03-04 RX ADMIN — SODIUM CHLORIDE: 9 INJECTION, SOLUTION INTRAVENOUS at 05:23

## 2021-03-04 ASSESSMENT — COGNITIVE AND FUNCTIONAL STATUS - GENERAL
CLIMB 3 TO 5 STEPS WITH RAILING: A LITTLE
SUGGESTED CMS G CODE MODIFIER MOBILITY: CI
DAILY ACTIVITIY SCORE: 24
SUGGESTED CMS G CODE MODIFIER DAILY ACTIVITY: CH
MOBILITY SCORE: 23

## 2021-03-04 ASSESSMENT — PAIN DESCRIPTION - PAIN TYPE
TYPE: SURGICAL PAIN;ACUTE PAIN
TYPE: SURGICAL PAIN;ACUTE PAIN
TYPE: SURGICAL PAIN

## 2021-03-04 NOTE — CARE PLAN
Problem: Communication  Goal: The ability to communicate needs accurately and effectively will improve  Outcome: PROGRESSING AS EXPECTED  Note: Reviewed POC with patient. All questions and concerns addressed at this time.  Patient declined use of  services, understands it is available     Problem: Safety  Goal: Will remain free from injury  Outcome: PROGRESSING AS EXPECTED  Note: Patient reoriented to fall precautions. Using call light appropriately. Bed low/locked, non skid socks in place. Patient remains free from injury

## 2021-03-04 NOTE — PROGRESS NOTES
This RN took patient to [facility]. Belonging and med to bed medications with patient.

## 2021-03-04 NOTE — DISCHARGE PLANNING
Meds-to-Beds: Discharge prescription orders listed below delivered to patient's bedside. RN Crispin notified. Patient and her niece Citally counseled.      Nabor Higginbotham Caridad   Home Medication Instructions YARELI:[id number]    Printed on:03/04/21 1355   Medication Information                      cefdinir (OMNICEF) 300 MG Cap  Take 1 capsule by mouth 2 times a day for 5 days.             metroNIDAZOLE (FLAGYL) 500 MG Tab  Take 1 tablet by mouth 3 times a day for 5 days.             ondansetron (ZOFRAN ODT) 4 MG TABLET DISPERSIBLE  Take 1 tablet by mouth every 6 hours as needed for Nausea.             oxyCODONE immediate-release (ROXICODONE) 5 MG Tab  Take 1 tablet by mouth every 6 hours as needed for up to 5 days.                 Denia Cherry, PharmD

## 2021-03-04 NOTE — PROGRESS NOTES
"    DATE: 3/4/2021    HD 3: Gallstone pancreatitis    Interval Events:  Having expected post-operative pain  Tolerating clears  Hgb, LFTs stable  WBC normal  Discussed with Hospitalist    -OK to discharge after trial of solid food  -Toradol 30mg IV x1 to augment pain relief    Language Line was used through out this interaction.    PHYSICAL EXAMINATION:  Constitutional:     Vital Signs: /55   Pulse (!) 51   Temp 37 °C (98.6 °F) (Temporal)   Resp 16   Ht 1.5 m (4' 11.06\")   Wt 95.1 kg (209 lb 10.5 oz)   SpO2 99%    GENERAL:  No acute distress  HEENT: Pupils equal, round and reactive to light bilaterally.  Sclera are clear bilaterally.  No otorrhea.  No rhinorrhea.  Mucus membranes are moist.  CHEST:  Lungs are clear to auscultation bilaterally  CARDIOVASCULAR:  Regular rate and rhythm  ABDOMEN: Soft, appropriately tender, non-distended, incision clean x4  GENITOURINARY:  No encarnacion in place, voiding without issues  EXTREMITIES:  Good range of motion through out  NEUROLOGIC:  Alert and oriented.  Cranial Nerves II through XII are grossly intact, no focal deficits appreciated  PSYCHIATRIC:  Normal affect and mood appropriate for age and condition  SKIN:  Warm and well perfused, no rashes    Laboratory Values:   Recent Labs     03/02/21 0334 03/03/21 0232 03/04/21  0201   WBC 5.6 5.4 8.3   RBC 4.15* 4.01* 4.05*   HEMOGLOBIN 12.0 11.9* 12.1   HEMATOCRIT 39.1 37.5 37.8   MCV 94.2 93.5 93.3   MCH 28.9 29.7 29.9   MCHC 30.7* 31.7* 32.0*   RDW 49.8 51.4* 51.7*   PLATELETCT 212 176 184   MPV 10.4 9.9 10.1     Recent Labs     03/02/21 0334 03/03/21 0232 03/04/21  0201   SODIUM 143 138 141   POTASSIUM 3.6 3.3* 3.4*   CHLORIDE 112 107 106   CO2 20 19* 23   GLUCOSE 82 98 106*   BUN 8 6* 5*   CREATININE 0.65 0.54 0.44*   CALCIUM 8.7 8.9 8.6     Recent Labs     03/02/21 0334 03/03/21 0232 03/04/21  0201   ASTSGOT 172* 135* 120*   ALTSGPT 161* 143* 127*   TBILIRUBIN 1.6* 1.0 0.7   ALKPHOSPHAT 364* 386* 375* "   GLOBULIN 3.0 3.2 3.3   INR 1.10  --   --      Recent Labs     03/02/21  0334   INR 1.10        Imaging:   [id number] STONE REMOVAL   Final Result      Fluoroscopic image(s) obtained during ERCP and biliary stone removal. Please see the patient's chart for full procedural details.      Fluoroscopy time 19 seconds.         [id number]-W/O   Final Result      1.  Distal common bile duct stone measuring 5 mm with extrahepatic biliary dilation.   2.  Cholelithiasis and gallbladder wall thickening suggesting acute cholecystitis.      US-RUQ   Final Result      1.  There is cholelithiasis with gallbladder sludge, mild wall thickening and biliary dilatation. In the appropriate clinical setting this could represent acute cholecystitis.             ASSESSMENT AND PLAN:     * Acute gallstone pancreatitis- (present on admission)  Assessment & Plan  Confirmed on MRCP  3/2 ERCP with sphincterotomy and stone extraction  3/3 Lap Amber        ____________________________________     Norman Orozco M.D.

## 2021-03-04 NOTE — NON-PROVIDER
This note is intended for the purposes of medical student education and feedback only.   Please refer to the documentation by this patient's assigned medical practitioner for details of care and plans.    Reason for admission: Shaun Higginbotham is a 55 y.o. female who was admitted on 2/28 for gallstone pancreatitis    HD/POD#: HD 4. POD 1 S/P cholecystectomy on 3/3 secondary to gallstone pancreatitis. Abnormal MRCP on 3/1. Pt had an ERCP with Dr. Gilmore on 3/2 which revealed calculus of bile duct with acute cholangitis without obstruction. Cholecystomy performed on 3/3/21 by Dr. Orozco.    SUBJECTIVE  Per nursing, pt complaining of surgical pain that is well controlled with pain medication. She has had no complaints of nausea.     This morning the pt feels good. She has complaints of abdominal pain/inflammation that is well controlled with pain medication. She is tolerating clear liquids and has had no N/V. She believes that she can advance her diet today. No flatus or BM yet. Able to ambulate to the bathroom without assistance.     was used throughout this encounter    OBJECTIVE   Vital Signs:  • Max 24 hour temp:98.7  • Current temp: 98.7  • Current HR:59  • Current BP:126/54  • Current RR:16  • Current O2 Sat: 92% RA    Physical Exam:  General: NAD  HEENT: PERRLA. EOMI.   Cardiovascular: Bradycardic, but regular. No murmurs, rubs or gallops  Respiratory: CTAB  Abdominal exam: Soft with diffuse tenderness worse in epigastric and RUQ. No distension. 4 laparoscopic sites with no drainage. Mild erythema and ecchymosis noted to the epigastric laparoscopic incision. No rebound or guarding. BS present in all 4 quadrants.  Neurological: AxOx3. CN II-XII grossly intact. No focal neuro deficits    Ins/Outs:  • P/O Intake:60  • IV Intake:2200  • Urine output: not measured  • Drain output:N/A  • Other: Blood 25      Lab Results:  Recent Labs     03/02/21  0334 03/03/21  0232 03/04/21  0201   WBC 5.6 5.4  8.3   RBC 4.15* 4.01* 4.05*   HEMOGLOBIN 12.0 11.9* 12.1   HEMATOCRIT 39.1 37.5 37.8   MCV 94.2 93.5 93.3   MCH 28.9 29.7 29.9   MCHC 30.7* 31.7* 32.0*   RDW 49.8 51.4* 51.7*   PLATELETCT 212 176 184   MPV 10.4 9.9 10.1     Recent Labs     03/02/21  0334 03/03/21  0232 03/04/21  0201   SODIUM 143 138 141   POTASSIUM 3.6 3.3* 3.4*   CHLORIDE 112 107 106   CO2 20 19* 23   GLUCOSE 82 98 106*   BUN 8 6* 5*   CREATININE 0.65 0.54 0.44*   CALCIUM 8.7 8.9 8.6     Recent Labs     03/02/21  0334   INR 1.10     Recent Labs     03/02/21  0334 03/03/21 0232 03/04/21  0201   ASTSGOT 172* 135* 120*   ALTSGPT 161* 143* 127*   TBILIRUBIN 1.6* 1.0 0.7   ALKPHOSPHAT 364* 386* 375*   GLOBULIN 3.0 3.2 3.3   INR 1.10  --   --        Imaging Results:  [id number] STONE REMOVAL   Final Result      Fluoroscopic image(s) obtained during ERCP and biliary stone removal. Please see the patient's chart for full procedural details.      Fluoroscopy time 19 seconds.         [id number]-A/O   Final Result      1.  Distal common bile duct stone measuring 5 mm with extrahepatic biliary dilation.   2.  Cholelithiasis and gallbladder wall thickening suggesting acute cholecystitis.      US-RUQ   Final Result      1.  There is cholelithiasis with gallbladder sludge, mild wall thickening and biliary dilatation. In the appropriate clinical setting this could represent acute cholecystitis.               ASSESSMENT/PLAN  Pt with complaints of RUQ pain since 2/28. US revealaed cholelithiasis with gallbladder sludge and mild biliary dilation. Abnormal MRCP on 3/1. ERCP with stone extraction and biliary sphinectomy performed on 3/2 with Dr. Gilmore. Cholecystectomy on 3/3 with Dr. Orozco. AST and ALT continue to improve toda to 120 and 127 respectively. Alk phos improved from yesterday to 375. Total bili normalized 0.7. Lipase 26. WBC and H&H normal.    #Gallstone pancreatitis  -Toradol 30 mg IV x 1 given  -Advance diet as tolerated  -Plan for D/C home  today      PROPHYLAXIS  • DVT: Heparin  • GI: Senna-docusate, polyethylene glycol, magnesium hydroxide, bisacodyl

## 2021-03-04 NOTE — DISCHARGE SUMMARY
Discharge Summary    CHIEF COMPLAINT ON ADMISSION  Chief Complaint   Patient presents with   • Abdominal Pain       Reason for Admission  Abd Pain     Admission Date  2/28/2021    CODE STATUS  Full Code    HPI & HOSPITAL COURSE  This is a 55 y.o. female here with with gallstone pancreatitis, cholelithiasis and cholecystitis, choledocholithiasis.  Surgery and gastroenterology were consulted on the case.  She was placed empirically on IV Rocephin and Flagyl for antibiotic coverage.  Gastroenterology performed ERCP - biliary sphincterotomy performed with balloon common bile duct stones x10 extraction and pus, multiple yellowish 3 mm to 1cm stones removed on 3/2/2021. She tolerated this procedure well.  Patient then underwent laparoscopic cholecystectomy on 3/3/2021.  She was started on clear liquid diet, and advance as tolerated.  Her pain was well controlled.  Surgery has cleared her for discharge.    Therefore, she is discharged in good and stable condition to home with close outpatient follow-up.    The patient recovered much more quickly than anticipated on admission.    Discharge Date  3/4/2021    FOLLOW UP ITEMS POST DISCHARGE  Follow-up with Surgery    DISCHARGE DIAGNOSES  Principal Problem:    Acute gallstone pancreatitis POA: Yes  Active Problems:    Choledocholithiasis  POA: Yes    Cholecystitis POA: Yes    Hypokalemia POA: Yes    Hypomagnesemia POA: Yes    Class 3 severe obesity due to excess calories with serious comorbidity and body mass index (BMI) of 40.0 to 44.9 in adult (HCC) POA: Yes  Resolved Problems:    * No resolved hospital problems. *      FOLLOW UP  No future appointments.  Norman Orozco M.D.  [address]  [facility] [id number] [phone]    In 1 week        MEDICATIONS ON DISCHARGE     Medication List      START taking these medications      Instructions   cefdinir 300 MG Caps  Commonly known as: OMNICEF   Take 1 capsule by mouth 2 times a day for 5 days.  Dose: 300 mg      metroNIDAZOLE 500 MG Tabs  Commonly known as: FLAGYL   Take 1 tablet by mouth 3 times a day for 5 days.  Dose: 500 mg     ondansetron 4 MG Tbdp  Commonly known as: ZOFRAN ODT   Take 1 tablet by mouth every 6 hours as needed for Nausea.  Dose: 4 mg     oxyCODONE immediate-release 5 MG Tabs  Commonly known as: ROXICODONE   Take 1 tablet by mouth every 6 hours as needed for up to 5 days.  Dose: 5 mg        STOP taking these medications    HYDROcodone-acetaminophen 5-325 MG Tabs per tablet  Commonly known as: NORCO     ibuprofen 800 MG Tabs  Commonly known as: MOTRIN            Allergies  No Known Allergies    DIET  Orders Placed This Encounter   Procedures   • Diet Order Diet: Regular     Standing Status:   Standing     Number of Occurrences:   1     Order Specific Question:   Diet:     Answer:   Regular [1]   • Discontinue Diet Tray     Standing Status:   Standing     Number of Occurrences:   1       ACTIVITY  Light duty.  Weight bearing as tolerated    CONSULTATIONS  Gastroenterology - [place]  Surgery - [place]    PROCEDURES  ERCP - biliary sphincterotomy performed with balloon common bile duct stones x10 extraction and pus, multiple yellowish 3 mm to 1cm stones removed. 3/2/2021    Laparoscopic cholecystectomy 3/3/2021    LABORATORY  Lab Results   Component Value Date    SODIUM 141 03/04/2021    POTASSIUM 3.4 (L) 03/04/2021    CHLORIDE 106 03/04/2021    CO2 23 03/04/2021    GLUCOSE 106 (H) 03/04/2021    BUN 5 (L) 03/04/2021    CREATININE 0.44 (L) 03/04/2021        Lab Results   Component Value Date    WBC 8.3 03/04/2021    HEMOGLOBIN 12.1 03/04/2021    HEMATOCRIT 37.8 03/04/2021    PLATELETCT 184 03/04/2021        Total time of the discharge process exceeds 30 minutes.

## 2021-03-04 NOTE — CARE PLAN
Problem: Communication  Goal: The ability to communicate needs accurately and effectively will improve  Outcome: PROGRESSING AS EXPECTED    Utilizing  when necessary     Problem: Safety  Goal: Will remain free from injury  Outcome: PROGRESSING AS EXPECTED    Patient calls appropriately

## 2021-03-04 NOTE — CARE PLAN

## 2021-03-04 NOTE — PROGRESS NOTES
Assumed care of patient. Report received. Assessment complete.  All questions and concerns addressed.    No visible signs of distress. VSS. Pt reported surgical pain, medicated per MAR. No complaints of nausea. +BS, +flatus, -BM. Bed locked and in low position.

## 2021-03-04 NOTE — PROGRESS NOTES
Assumed care of patient at 0645. Bedside report received. Patient Serbian speaking. Able to communicate with this RN, declined use of  services at this time. Patient is aware  is available for use. Assessment complete.  AA&Ox4. Denies CP/SOB.  Reporting 9/10 pain. Medicated per MAR.   Educated patient regarding pharmacologic and non pharmacologic modalities for pain management.  Lap sites x 4 well approximated with dermabond, no evidence of incision opening.  Tolerating diet. Denies N/V.  + void. Last BM PTA + flatus  Pt ambulates with stand by assist.  All needs met at this time. Call light within reach. Pt calls appropriately. Bed low and locked, non skid socks in place. Hourly rounding in place.

## 2021-03-04 NOTE — DISCHARGE INSTRUCTIONS
Instrucciones de yuri de colecistectomía laparoscópica del Dr. Orozco:    1. DIETA: Lorna vez dado de yuri del hospital, puede reanudar horn dieta preoperatoria normal. Dependiendo de cómo se sienta y si tiene náuseas o no, es posible que desee seguir con lorna dieta blanda ketty los primeros días. Sin embargo, puede avanzar en esto tan rápido karmen se sienta listo.    2. ACTIVIDADES: Tiene lorna restricción de peso de 15 libras ketty dos semanas después de la cirugía. Las actividades de rutina karmen bañarse, caminar, subir y bajar escaleras y conducir * (ed más abajo) son seguras. Evite las actividades extenuantes y el ejercicio que implique girar, agacharse y correr.    3. CONDUCIR: Puede conducir siempre que no tenga analgésicos y pueda realizar las actividades necesarias para conducir, es decir, girar, agacharse, torcerse, abrocharse el cinturón de seguridad, etc.    4. HERIDA / BAÑO: Puede mojar la herida en cualquier momento después de salir del hospital. Puede ducharse, moira no se sumerja en un baño ni en lorna piscina ketty al menos lorna semana. Puede despegar el pegamento para la piel con un dedo o un par de pinzas lorna semana después de la cirugía.    5. FUNCIÓN INTESTINAL: Algunos pacientes, después de esta operación, desarrollarán deposiciones frecuentes o blandas después de las comidas. Por lo general, esto se corrige solo después de unos días, a unos meses.    Mucho más común que las heces blandas es el estreñimiento. La combinación de analgésicos y disminución de la actividad después de la cirugía puede causar estreñimiento en pacientes por lo demás normales. Si siente que esto está ocurriendo, tome un laxante de venta jan (Leche de magnesia, Ex-Lax, Senokot, Miralax, Citrato de magnesio, etc.) hasta que el problema se haya resuelto.    6. MEDICAMENTOS PARA EL DOLOR: Se le dará lorna receta para medicamentos para el dolor al momento del yuri. Tómelos según las indicaciones. Es importante recordar no leslie  medicamentos con el estómago vacío, ya que esto puede causar náuseas. Destete el uso de analgésicos tan pronto karmen sea posible.    7. LLAME SI TIENE: (1) Fiebre a más de 101F, (2) Dolor inusual en el pecho o las piernas, (3) Drenaje o líquido de la incisión que puede tener mal olor, aumento de la sensibilidad o dolor en la herida o en los bordes de la herida. ya no están juntos, enrojecimiento o hinchazón en el lugar de la incisión. No dude en llamarnos si tiene alguna otra pregunta.    8. FIDELINA: Comuníquese con nuestra oficina al [phone] para lorna fidelina de seguimiento en 1 a 2 semanas después de horn procedimiento.    Si tiene alguna pregunta adicional, no dude en llamar al consultorio y hablar conmigo o con el médico de sveta.    Dirección de la oficina:  [address], [address] [place] [address]    Sanket Orozco M.D.  [facility]  [phone]        ENDOSCOPY HOME CARE INSTRUCTIONS    GASTROSCOPY OR ERCP  1. Don't eat or drink anything for about an hour after the test. You can then resume your regular diet.  2. Don't drive or drink alcohol for 24 hours. The medication you received will make you too drowsy.  3. Don't take any coffee, tea, or aspirin products until after you see your doctor. These can harm the lining of your stomach.  4. If you begin to vomit bloody material, or develop black or bloody stools, call your doctor as soon as possible.  5. If you have any neck, chest, abdominal pain or temp of 100 degrees, call your doctor.        You should call [phone] if you develop problems with breathing or chest pain.  If any questions arise, call your doctor. If your doctor is not available, please feel free to call [phone]. You can also call the [facility] open 24 hours/day, 7 days/week and speak to a nurse at [phone], or toll free [phone].    Depression / Suicide Risk    As you are discharged from this [facility] facility, it is important to learn how to keep safe from  harming yourself.    Recognize the warning signs:  · Abrupt changes in personality, positive or negative- including increase in energy   · Giving away possessions  · Change in eating patterns- significant weight changes-  positive or negative  · Change in sleeping patterns- unable to sleep or sleeping all the time   · Unwillingness or inability to communicate  · Depression  · Unusual sadness, discouragement and loneliness  · Talk of wanting to die  · Neglect of personal appearance   · Rebelliousness- reckless behavior  · Withdrawal from people/activities they love  · Confusion- inability to concentrate     If you or a loved one observes any of these behaviors or has concerns about self-harm, here's what you can do:  · Talk about it- your feelings and reasons for harming yourself  · Remove any means that you might use to hurt yourself (examples: pills, rope, extension cords, firearm)  · Get professional help from the community (Mental Health, Substance Abuse, psychological counseling)  · Do not be alone:Call your Safe Contact- someone whom you trust who will be there for you.  · Call your local CRISIS HOTLINE 015-4268 or 224-156-2384  · Call your local Children's Mobile Crisis Response Team [place] (672) 134-6037 or www.[employer].  · Call the toll free National Suicide Prevention Hotlines   · National Suicide Prevention Lifeline 475-960-CMXL (2805)  · National Hope Line Network 800-SUICIDE (511-7225)    I acknowledge receipt and understanding of these Home Care Instructions.    Discharge Instructions    Discharged to home by car with relative. Discharged via wheelchair, hospital escort: Yes.  Special equipment needed: Not Applicable    Be sure to schedule a follow-up appointment with your primary care doctor or any specialists as instructed.     Discharge Plan:   Diet Plan: Discussed  Activity Level: Discussed  Confirmed Follow up Appointment: Patient to Call and Schedule Appointment  Confirmed Symptoms  Management: Discussed  Medication Reconciliation Updated: Yes  Influenza Vaccine Indication: Patient Refuses    I understand that a diet low in cholesterol, fat, and sodium is recommended for good health. Unless I have been given specific instructions below for another diet, I accept this instruction as my diet prescription.   Other diet: Low fat diet    Special Instructions: None    · Is patient discharged on Warfarin / Coumadin?   No     Depression / Suicide Risk    As you are discharged from this [facility] facility, it is important to learn how to keep safe from harming yourself.    Recognize the warning signs:  · Abrupt changes in personality, positive or negative- including increase in energy   · Giving away possessions  · Change in eating patterns- significant weight changes-  positive or negative  · Change in sleeping patterns- unable to sleep or sleeping all the time   · Unwillingness or inability to communicate  · Depression  · Unusual sadness, discouragement and loneliness  · Talk of wanting to die  · Neglect of personal appearance   · Rebelliousness- reckless behavior  · Withdrawal from people/activities they love  · Confusion- inability to concentrate     If you or a loved one observes any of these behaviors or has concerns about self-harm, here's what you can do:  · Talk about it- your feelings and reasons for harming yourself  · Remove any means that you might use to hurt yourself (examples: pills, rope, extension cords, firearm)  · Get professional help from the community (Mental Health, Substance Abuse, psychological counseling)  · Do not be alone:Call your Safe Contact- someone whom you trust who will be there for you.  · Call your local CRISIS HOTLINE 976-0238 or 938-228-3603  · Call your local Children's Mobile Crisis Response Team [place] (495) 345-3807 or www.[employer]  · Call the toll free National Suicide Prevention Hotlines   · National Suicide Prevention Lifeline  045-737-KWOG (1592)  · [facility] Network 800-SUICIDE (321-4918)    Cefdinir capsules  ¿Qué es brenna medicamento?  El CEFDINIR es un antibiótico cefalosporínico. Se utiliza en el tratamiento de ciertos tipos de infecciones bacterianas. No es efectivo para resfríos, gripe u otras infecciones de origen viral.  Brenna medicamento puede ser utilizado para otros usos; si tiene alguna pregunta consulte con horn proveedor de atención médica o con horn farmacéutico.  MARCAS COMUNES: Omnicef  ¿Qué le iraj informar a mi profesional de la franklyn antes de leslie brenna medicamento?  Necesita saber si usted presenta alguno de los siguientes problemas o situaciones:  · problemas de sangrado  · enfermedad renal  · problemas estomacales o intestinales (especialmente colitis)  · lorna reacción alérgica o inusual al cefdinir, a otros antibióticos cefalosporínicos, la penicilina, la penicilamina, otros alimentos, colorantes o conservantes  · si está embarazada o buscando quedar embarazada  · si está amamantando a un bebé  ¿Cómo iraj utilizar brenna medicamento?  [place] brenna medicamento por vía oral. Ingiéralo con un vaso de agua. Siga las instrucciones de la etiqueta del medicamento. Puede tomarlo con o sin alimentos. Si le produce malestar estomacal, puede ser conveniente tomarlo con alimentos. [place] greer dosis a intervalos regulares. No tome horn medicamento con lonra frecuencia mayor que la indicada. Termine todo el medicamento recetado, aun si considera que horn infección delgado davey.  Hable con horn pediatra para informarse acerca del uso de brenna medicamento en niños. Puede requerir atención especial.  Sobredosis: Póngase en contacto inmediatamente con un centro toxicológico o lorna ruthie de urgencia si usted hubert que haya tomado demasiado medicamento.  ATENCIÓN: Brenna medicamento es solo para usted. No comparta brenna medicamento con nadie.  ¿Qué sucede si me olvido de lorna dosis?  Si olvida lorna dosis, tómela lo antes posible. Si es gaby la hora de la  próxima dosis, tome sólo jennie dosis. No tome dosis adicionales o dobles.  ¿Qué puede interactuar con brenna medicamento?  · antiácidos que contienen aluminio o magnesio  · suplementos de george  · otros antibióticos  · probenecid  Puede ser que esta lista no menciona todas las posibles interacciones. Informe a horn profesional de la franklyn de todos los productos a base de hierbas, medicamentos de venta jan o suplementos nutritivos que esté tomando. Si usted fuma, consume bebidas alcohólicas o si utiliza drogas ilegales, indíqueselo también a horn profesional de la franklyn. Algunas sustancias pueden interactuar con horn medicamento.  ¿A qué iraj estar atento al usar brenna medicamento?  Si los síntomas no comienzan a mejorar a los pocos días, informe a horn médico a horn profesional de la franklyn.  Si es diabético, podrá obtener un resultado positivo falso en los análisis de determinación del nivel de azúcar en la orina. Consulte con horn médico o con horn profesional de la franklyn antes de modificar horn dieta o la dosis de horn medicamento para la diabetes.  ¿Qué efectos secundarios puedo tener al utilizar brenna medicamento?  Efectos secundarios que debe informar a horn médico o a horn profesional de la franklyn tan pronto karmen sea posible:  reacciones alérgicas, karmen erupción cutánea, comezón/picazón o urticarias, e hinchazón de la omari, los labios o la lengua diarrea con adria o acuosa problemas respiratorios fiebre enrojecimiento, formación de ampollas, descamación o distensión de la piel, incluso dentro de la boca convulsiones dificultad para orinar o cambios en el volumen de orina sangrado o moretones inusuales cansancio o debilidad inusual  Efectos secundarios que generalmente no requieren atención médica (infórmelos a horn médico o a horn profesional de la franklyn si persisten o si son molestos):  estreñimiento diarrea mareos boca seca dolor de kobe pérdida del apetito náuseas, vómito dolor estomacal decoloración de las heces cansancio flujo  vaginal, comezón/picazón u olor en las mujeres  Puede ser que esta lista no menciona todos los posibles efectos secundarios. Comuníquese a horn médico por asesoramiento médico sobre los efectos secundarios. [place] puede informar los efectos secundarios a la FDA por teléfono al 1-800FDA-7788.  ¿Dónde iraj guardar mi medicina?  Manténgala fuera del alcance de los niños.  Guárdela a temperatura ambiente, entre 15 y 30 grados C (59 y 86 grados F). Deseche todo el medicamento que no haya utilizado, después de la fecha de vencimiento.  ATENCIÓN: Richa folleto es un resumen. Puede ser que no cubra toda la posible información. Si usted tiene preguntas acerca de esta medicina, consulte con horn médico, horn farmacéutico o horn profesional de la franklyn.  © 2020 Elsevier/Gold Standard (2017-06-29 00:00:00)  Metronidazole tablets or capsules  ¿Qué es richa medicamento?  El METRONIDAZOL es un antiinfeccioso. Se utiliza en el tratamiento de ciertos tipos de infecciones bacterianas y por protozoos. No es efectivo para resfríos, gripe u otras infecciones de origen viral.  Richa medicamento puede ser utilizado para otros usos; si tiene alguna pregunta consulte con horn proveedor de atención médica o con horn farmacéutico.  MARCAS COMUNES: Flagyl  ¿Qué le iraj informar a mi profesional de la franklyn antes de leslie richa medicamento?  Necesitan saber si usted presenta alguno de los siguientes problemas o situaciones:  síndrome de [place] antecedentes de enfermedades de la adria, tales karmen anemia falciforme o leucemia antecedentes de infección por cándida si reji alcohol con frecuencia enfermedad hepática lorna reacción alérgica o inusual al metronidazol, a los nitroimidazoles, a otros medicamentos, alimentos, colorantes o conservantes si está embarazada o buscando quedar embarazada si está amamantando a un bebé  ¿Cómo iraj utilizar richa medicamento?  [place] richa medicamento por vía oral con un vaso lleno de agua. Siga las instrucciones de la etiqueta del  medicamento. [place] greer dosis a intervalos regulares. No tome horn medicamento con lorna frecuencia mayor a la indicada. Complete todo el tratamiento con el medicamento karmen recetado aun si se siente mejor. No omita ninguna dosis o suspenda el uso de horn medicamento antes de lo indicado.  Hable con horn pediatra para informarse acerca del uso de brenna medicamento en niños. Puede requerir atención especial.  Sobredosis: Póngase en contacto inmediatamente con un centro toxicológico o lorna ruthie de urgencia si usted hubert que haya tomado demasiado medicamento.  ATENCIÓN: Brenna medicamento es solo para usted. No comparta brenna medicamento con nadie.  ¿Qué sucede si me olvido de lorna dosis?  Si olvida lorna dosis, tómela lo antes posible. Si es gaby la hora de la próxima dosis, tome sólo jennie dosis. No tome dosis adicionales o dobles.  ¿Qué puede interactuar con brenna medicamento?  No tome brenna medicamento con ninguno de los siguientes fármacos:  alcohol o cualquier producto que contenga alcohol cisaprida disulfiram dofetilida dronedarona pimozida tioridazina ziprasidona  Esta medicina también puede interactuar con los siguientes medicamentos:  amiodarona píldoras anticonceptivas busulfán carbamazepina cimetidina ciclosporina fluorouracilo litio otros medicamentos que prolongan el intervalo QT (causan un ritmo cardiaco anormal) fenobarbital fenitoína quinidina tacrolimús vecuronio warfarina  Puede ser que esta lista no menciona todas las posibles interacciones. Informe a horn profesional de la franklyn de todos los productos a base de hierbas, medicamentos de venta jan o suplementos nutritivos que esté tomando. Si usted fuma, consume bebidas alcohólicas o si utiliza drogas ilegales, indíqueselo también a horn profesional de la franklyn. Algunas sustancias pueden interactuar con horn medicamento.  ¿A qué iraj estar atento al usar brenna medicamento?  Informe a horn médico o a horn profesional de la franklyn si greer síntomas no mejoran o empeoran.  Puede  experimentar somnolencia o mareos. No conduzca, no utilice maquinaria ni martina nada que le exija permanecer en estado de alerta hasta que sepa cómo le afecta brenna medicamento. No se siente ni se ponga de pie con rapidez, especialmente si es un paciente de edad avanzada. [place] reduce el riesgo de mareos o desmayos.  Pregunte a horn médico o profesional de la franklyn si debe evitar el alcohol. Muchos productos de venta jan para la tos y el resfrío contienen alcohol. El metronidazol puede causar lorna reacción desagradable cuando se heidi con alcohol. Esta reacción incluye enrojecimiento, dolor de kobe, náuseas, vómitos, sudoración y aumento de la sed. La reacción puede durar de 30 minutos a varias horas.  Si está recibiendo tratamiento por lorna enfermedad de transmisión sexual, evite el contacto sexual hasta completar el tratamiento. Es posible que horn carlos sexual también necesite tratamiento.  ¿Qué efectos secundarios puedo tener al utilizar brenna medicamento?  Efectos secundarios que debe informar a horn médico o a horn profesional de la franklyn tan pronto karmen sea posible:  reacciones alérgicas, karmen erupción cutánea o urticaria, hinchazón de la omari, los labios o la lengua confusión ritmo cardiaco rápido, irregular fiebre, escalofríos, dolor de garganta fiebre con erupción cutánea, ganglios linfáticos inflamados o hinchazón del charli dolor, hormigueo o entumecimiento de las sai o los pies enrojecimiento, formación de ampollas, descamación o distensión de la piel, incluso dentro de la boca convulsiones signos y síntomas de lesión al hígado, karmen orina amarilla oscura o marrón; sensación general de estar enfermo o síntomas gripales; heces claras; pérdida de apetito; náuseas; dolor en la región abdominal superior derecha; cansancio o debilidad inusual; color amarillento de los ojos o la piel flujo vaginal, comezón/picazón u olor en las mujeres  Efectos secundarios que generalmente no requieren atención médica (infórmelos a horn  médico o a horn profesional de la franklyn si persisten o si son molestos):  cambios en el sentido del gusto diarrea dolor de kobe náuseas, vómito dolor estomacal  Puede ser que esta lista no menciona todos los posibles efectos secundarios. Comuníquese a horn médico por asesoramiento médico sobre los efectos secundarios. Usted puede informar los efectos secundarios a la FDA por teléfono al 1-800-FDA-1088.  ¿Dónde iraj guardar mi medicina?  Manténgala fuera del alcance de los niños.  Guárdela a temperatura ambiente, menos de 25 grados C (77 grados F). Protéjala romana. Mantenga el envase jayce cerrado. Deseche todo el medicamento que no haya utilizado, después de la fecha de vencimiento.  ATENCIÓN: Brenna folleto es un resumen. Puede ser que no cubra toda la posible información. Si usted tiene preguntas acerca de esta medicina, consulte con horn médico, horn farmacéutico o horn profesional de la franklyn.  © 2020 Elsevier/Gold Standard (2018-05-07 00:00:00)  Ondansetron tablets  ¿Qué es brenna medicamento?  El ONDANSETRÓN se utiliza para tratar las náuseas y el vómito causados por la quimioterapia. También se puede utilizar para prevenir o tratar las náuseas y el vómito después de lorna operación.  Brenna medicamento puede ser utilizado para otros usos; si tiene alguna pregunta consulte con horn proveedor de atención médica o con horn farmacéutico.  MARCAS COMUNES: Zofran  ¿Qué le iraj informar a mi profesional de la franklyn antes de leslie brenna medicamento?  Necesita saber si usted presenta alguno de los siguientes problemas o situaciones:  · enfermedad cardiaca  · antecedentes de pulso cardiaco irregular  · enfermedad hepática  · niveles bajos de magnesio o potasio en la adria  · lorna reacción alérgica o inusual al ondansetrón, granisetrón, a otros medicamentos, alimentos, colorantes o conservantes  · si está embarazada o buscando quedar embarazada  · si está amamantando a un bebé  ¿Cómo iraj utilizar brenna medicamento?  [place] brenna medicamento  por vía oral con un vaso de agua. Siga las instrucciones de la etiqueta del medicamento. [place] greer dosis a intervalos regulares. No tome horn medicamento con lorna frecuencia mayor que la indicada.  Hable con horn pediatra para informarse acerca del uso de brenna medicamento en niños. Puede requerir atención especial.  Sobredosis: Póngase en contacto inmediatamente con un centro toxicológico o lorna ruthie de urgencia si usted hubert que haya tomado demasiado medicamento.  ATENCIÓN: Brenna medicamento es solo para usted. No comparta brenna medicamento con nadie.  ¿Qué sucede si me olvido de lorna dosis?  Si olvida lorna dosis, tómela lo antes posible. Si es gaby la hora de la próxima dosis, tome sólo jennie dosis. No tome dosis adicionales o dobles.  ¿Qué puede interactuar con brenna medicamento?  No tome esta medicina con ninguno de los siguientes medicamentos:  apomorfina ciertos medicamentos para infecciones micóticas, tales karmen fluconazol, quetoconazol, itraconazol, posaconazol, voriconazol cisapride dofetilida dronedarona pimozida tioridazina ziprasidona  Esta medicina también puede interactuar con los siguientes medicamentos:  carbamazepina ciertos medicamentos para la depresión, ansiedad o trastornos psicóticos fentanilo linezolid IMAOs, tales karmen Carbex, Eldepryl, Marplan, [place] y [place] alicia de metileno (inyección por vía intravenosa) otros medicamentos que prolongan el intervalo QT (causa un ritmo cardiaco anormal) fenitoína rifampicina tramadol  Puede ser que esta lista no menciona todas las posibles interacciones. Informe a horn profesional de la franklyn de todos los productos a base de hierbas, medicamentos de venta jan o suplementos nutritivos que esté tomando. Si usted fuma, consume bebidas alcohólicas o si utiliza drogas ilegales, indíqueselo también a horn profesional de la franklyn. Algunas sustancias pueden interactuar con horn medicamento.  ¿A qué iraj estar atento al usar brenna medicamento?  Si experimenta algún signo de  reacción alérgica, consulte a horn médico o a horn profesional de la franklyn lo antes posible.  ¿Qué efectos secundarios puedo tener al utilizar brenna medicamento?  Efectos secundarios que debe informar a horn médico o a horn profesional de la franklyn tan pronto karmen sea posible:  · reacciones alérgicas karmen erupción cutánea, picazón o urticarias, hinchazón de la omari, labios o lengua  · problemas respiratorios  · confusión  · mareos  · pulso cardiaco rápido o irregular  · sensación de desmayos o aturdimiento, caídas  · fiebre y escalofríos  · pérdida del equilibrio o coordinación  · convulsiones  · sudoración  · hinchazón de las sai o pies  · opresión en el pecho  · temblores  · cansancio o debilidad inusual  Efectos secundarios que, por lo general, no requieren atención médica (debe informarlos a horn médico o a horn profesional de la franklyn si persisten o si son molestos):  · estreñimiento o diarrea  · dolor de kobe  Puede ser que esta lista no menciona todos los posibles efectos secundarios. Comuníquese a horn médico por asesoramiento médico sobre los efectos secundarios. Usted puede informar los efectos secundarios a la FDA por teléfono al 1-800-[facility]-8450.  ¿Dónde iraj guardar mi medicina?  Manténgala fuera del alcance de los niños.  Guárdela a lorna temperatura de entre 2 y 30 grados C (36 y 86 grados F). Deseche todo el medicamento que no haya utilizado, después de la fecha de vencimiento.  ATENCIÓN: Brenna folleto es un resumen. Puede ser que no cubra toda la posible información. Si usted tiene preguntas acerca de esta medicina, consulte con horn médico, horn farmacéutico o horn profesional de la franklyn.  © 2020 Elsevier/Gold Standard (2016-02-09 00:00:00)  Oxycodone tablets or capsules  ¿Qué es brenna medicamento?  La OXICODONA es un analgésico. Se usa para tratar el dolor moderado a severo.  Brenna medicamento puede ser utilizado para otros usos; si tiene alguna pregunta consulte con horn proveedor de atención médica o con horn  farmacéutico.  MARCAS COMUNES: Dazidox, Endocodone, Oxaydo, OXECTA, OxyIR, Percolone, Roxicodone, Roxybond  ¿Qué le iraj informar a mi profesional de la franklyn antes de leslie brenna medicamento?  Necesitan saber si usted presenta alguno de los siguientes problemas o situaciones:  enfermedad de [place] tumor cerebral lesión de la kobe enfermedad cardiaca antecedentes de abuso de alcohol o drogas si reji alcohol con frecuencia enfermedad renal enfermedad hepática enfermedad pulmonar o respiratoria, karmen asma trastorno mental enfermedad pancreática convulsiones enfermedad tiroidea lorna reacción inusual o alérgica a la oxicodona, codeína, hidrocodona, morfina, a otros medicamentos, alimentos, colorantes o conservantes si está embarazada o buscando quedar embarazada si está amamantando a un bebé  ¿Cómo iraj utilizar brenna medicamento?  [place] brenna medicamento por vía oral con un vaso de agua. Siga las instrucciones de la etiqueta del medicamento. Puede tomarlo con o sin alimentos. Si el medicamento le produce malestar estomacal, tómelo con alimentos. [place] horn medicamento a intervalos regulares. No lo tome con lorna frecuencia mayor a la indicada. No deje de tomarlo, excepto si así lo indica horn médico.  Algunas marcas de brenna medicamento, karmen Oxecta, tienen instrucciones especiales. Consulte a horn médico o farmacéutico si estas instrucciones son para usted: no jackie, triture ni mastique brenna medicamento. Trague solo lorna tableta a la vez. No humedezca, moje, ni chupe la tableta antes de tomarla.  Horn farmacéutico le dará lorna Guía del medicamento especial (MedGuide, nombre en inglés) con cada receta y en cada ocasión que la vuelva a surtir. Asegúrese de leer esta información cada vez cuidadosamente.  Hable con horn pediatra para informarse acerca del uso de brenna medicamento en niños. Puede requerir atención especial.  Sobredosis: Póngase en contacto inmediatamente con un centro toxicológico o lorna ruthie de urgencia si usted hubert que  haya tomado demasiado medicamento.  ATENCIÓN: Richa medicamento es solo para usted. No comparta richa medicamento con nadie.  ¿Qué sucede si me olvido de lorna dosis?  Si olvida lorna dosis, tómela lo antes posible. Si es gaby la hora de la próxima dosis, tome sólo jennie dosis. No tome dosis adicionales o dobles.  ¿Qué puede interactuar con richa medicamento?  Esta medicina puede interactuar con los siguientes medicamentos:  alcohol antihistamínicos para alergia, tos y resfrío medicamentos antivirales para el VIH o SIDA atropina ciertos antibióticos, tales karmen claritromicina, eritromicina, linezolida, rifampicina ciertos medicamentos para la ansiedad o para conciliar el sueño ciertos medicamentos para problemas de vejiga, tales karmen oxibutinina, tolterodina ciertos medicamentos para la depresión, karmen amitriptilina, fluoxetina, sertralina ciertos medicamentos para infecciones micóticas, tales karmen quetoconazol, itraconazol, voriconazol ciertos medicamentos para la migraña, tales karmen almotriptán, eletriptán, frovatriptán, naratriptán, rizatriptán, sumatriptán, zolmitriptán ciertos medicamentos para las náuseas o los vómitos, tales karmen dolasetrón, ondansetrón, palonosetrón ciertos medicamentos para el mal de Parkinson, tales karmen benzatropina, trihexifenidilo ciertos medicamentos para convulsiones, tales karmen fenobarbital, fenitoína, primidona ciertos medicamentos para problemas estomacales, tales karmen diciclomina, hiosciamina ciertos medicamentos para el mareo por movimiento, jesi karmen escopolamina diuréticos anestésicos generales, tales karmen halotano, isoflurano, metoxiflurano, propofol ipratropio anestésicos locales, tales karmen lidocaína, pramoxina, tetracaína IMAO, tales karmen Carbex, Eldepryl, Marplan, Nardil y Parnate medicamentos para relajar los músculos antes de lorna cirugía alicia de metileno nilotinib otros medicamentos narcóticos para el dolor o la tos fenotiazinas, tales karmen clorpromazina, mesoridazina,  proclorperazina, tioridazina  Puede ser que esta lista no menciona todas las posibles interacciones. Informe a horn profesional de la franklyn de todos los productos a base de hierbas, medicamentos de venta jan o suplementos nutritivos que esté tomando. Si usted fuma, consume bebidas alcohólicas o si utiliza drogas ilegales, indíqueselo también a horn profesional de la franklyn. Algunas sustancias pueden interactuar con horn medicamento.  ¿A qué iraj estar atento al usar brenna medicamento?  Informe a horn médico o a horn profesional de la franklyn si el dolor no desaparece, si empeora, o si tiene un dolor nuevo o diferente. Es posible que desarrolle tolerancia al medicamento. Tolerancia significa que necesitará lorna dosis más yuri del medicamento para aliviar el dolor. La tolerancia es normal y previsible si heidi brenna medicamento por mucho tiempo.  No deje de usar horn medicamento repentinamente porque puede desarrollar lorna reacción grave. Horn cuerpo se acostumbra al medicamento. [place] NO significa que usted es adicto. La adicción es lorna conducta relacionada a la obtención y el uso de lorna droga por razones que no son médicas. Si usted tiene dolor, tiene lorna razón médica para leslie el analgésico. Horn médico le dirá cuánto medicamento leslie. Si horn médico desea que deje de usar el medicamento, la dosis se irá disminuyendo lentamente ketty un tiempo para evitar cualquier efecto secundario.  Existen distintos tipos de medicamentos narcóticos (opiáceos). Si heidi más de un tipo al mismo tiempo o si está tomando otro medicamento que también causa somnolencia, es posible que tenga más efectos secundarios. Entréguele a horn proveedor de atención médica lorna lista de todos los medicamentos que usa. Horn médico le dirá cuánto medicamento leslie. No tome más medicamento que lo indicado. Llame al servicio de emergencias para recibir ayuda si tiene problemas para respirar o somnolencia inusual.  Puede experimentar somnolencia o mareos. No conduzca, no  utilice maquinaria ni martina nada que le exija permanecer en estado de alerta hasta que sepa cómo le afecta brenna medicamento. No se siente ni se ponga de pie con rapidez, especialmente si es un paciente de edad avanzada. [place] reduce el riesgo de mareos o desmayos. El alcohol puede interferir con el efecto de brenna medicamento. Evite consumir bebidas alcohólicas.  Brenna medicamento causará estreñimiento. Trate de evacuar los intestinos al menos cada 2 o 3 días. Si no evacua los intestinos ketty 3 días, comuníquese con horn médico o con horn profesional de la franklyn.  Se le podría secar la boca. Masticar chicle sin azúcar, chupar caramelos duros y leslie agua en abundancia le ayudará a mantener la boca húmeda. Si el problema no desaparece o es severo, consulte a horn médico.  ¿Qué efectos secundarios puedo tener al utilizar brenna medicamento?  Efectos secundarios que debe informar a horn médico o a horn profesional de la franklyn tan pronto karmen sea posible:  reacciones alérgicas, karmen erupción cutánea, comezón/picazón o urticarias, e hinchazón de la omari, los labios o la lengua problemas respiratorios confusión signos y síntomas de presión sanguínea baja, tales karmen mareos, sensación de desmayos o aturdimiento, caídas, cansancio o debilidad inusual dificultad para orinar o cambios en el volumen de orina problemas para tragar  Efectos secundarios que generalmente no requieren atención médica (infórmelos a horn médico o a horn profesional de la franklyn si persisten o si son molestos):  estreñimiento boca seca náuseas, vómito cansancio  Puede ser que esta lista no menciona todos los posibles efectos secundarios. Comuníquese a horn médico por asesoramiento médico sobre los efectos secundarios. Usted puede informar los efectos secundarios a la FDA por teléfono al 1-800-FDA-1088.  ¿Dónde iraj guardar mi medicina?  Mantenga fuera del alcance de los niños. Existe la posibilidad de abusar de brenna medicamento. Mantenga horn medicamento en un lugar seguro  para protegerlo contra robos. No comparta richa medicamento con nadie. Es peligroso  o regalar richa medicamento, y está prohibido por la shannon.  Guarde a temperatura ambiente, entre 15 y 30 grados Celsius (59 y 86 grados Fahrenheit). Proteja romana. Mantenga el recipiente jayce cerrado.  Richa medicamento puede causar daños y la muerte si lo luis f otros adultos, niños o mascotas. Regrese el medicamento que no haya utilizado a un lugar oficial para desecharlo. Contacte a la JAMILAH al [phone] o al gobierno de horn ciudad/condado para encontrar un lugar. Si no puede devolver el medicamento, arrójelo por el sanitario. No utilice richa medicamento después de la fecha de vencimiento.  ATENCIÓN: Richa folleto es un resumen. Puede ser que no cubra toda la posible información. Si usted tiene preguntas acerca de esta medicina, consulte con horn médico, horn farmacéutico o horn profesional de la franklyn.  © 2020 Elsevier/Gold Standard (2018-07-26 00:00:00)    Colecistectomía laparoscópica, cuidados posteriores  Laparoscopic Cholecystectomy, Care After  [place] esta información sobre cómo cuidarse después del procedimiento. El médico también podrá darle indicaciones más específicas. Si tiene problemas o preguntas, llame al médico.  Siga estas indicaciones en horn casa:  Cuidado de los barnes de la cirugía (incisiones)    · Siga las indicaciones del médico en lo que respecta al cuidado de los barnes de la cirugía. Long lo siguiente:  ? Lávese las sai con agua y jabón antes de cambiar las vendas (vendaje). Use un desinfectante para sai si no dispone de agua y jabón.  ? Cambie el vendaje karmen se lo haya indicado el médico.  ? No retire los puntos (suturas), la goma para cerrar la piel o las tiras adhesivas. Tor vez deban dejarse puestos en la piel ketty 2 semanas o más tiempo. Si las tiras adhesivas se despegan y se enroscan, puede recortar los bordes sueltos. No retire las tiras adhesivas por completo a menos que el médico lo  autorice.  · No tome katie de inmersión, no practique natación ni use el jacuzzi hasta que el médico lo autorice. Pregúntele al médico si puede ducharse. Tor vez solo le permitan gricelda katie de esponja.  · Controle la anish alrededor del jackie todos los días para detectar signos de infección. Esté atento a los siguientes signos:  ? Aumento del enrojecimiento, de la hinchazón o del dolor.  ? Más líquido o adria.  ? Calor.  ? Pus o mal olor.  Actividad  · No conduzca ni use maquinaria pesada mientras heidi analgésicos recetados.  · No levante ningún objeto que pese más de 10 libras (4,5 kg) hasta que el médico lo autorice.  · No practique deportes de contacto hasta que el médico lo autorice.  · No conduzca ketty 24 horas si le dieron un medicamento para ayudarlo a que se relaje (sedante).  · Descanse todo lo que sea necesario. No retome el trabajo ni el estudio hasta que el médico lo autorice.  Instrucciones generales  · Cataula los medicamentos de venta jan y los recetados solamente karmen se lo haya indicado el médico.  · A fin de prevenir o tratar el estreñimiento mientras heidi analgésicos recetados, el médico puede recomendarle lo siguiente:  ? Beber suficiente líquido para mantener el pis (orina) tyrone o de color amarillo pálido.  ? Gricelda medicamentos recetados o de venta jan.  ? Consumir alimentos ricos en fibra, karmen frutas y verduras frescas, cereales integrales y frijoles.  ? Limitar el consumo de alimentos con alto contenido de grasas y azúcares procesados, karmen alimentos fritos o dulces.  Comuníquese con un médico si:  · Le aparece lorna erupción cutánea.  · Tiene más enrojecimiento, hinchazón o dolor alrededor de los barnes de la cirugía.  · Aumenta la cantidad de líquido o adria que sale de los barnes.  · Los barnes de la cirugía se sienten calientes al tacto.  · Tiene pus o percibe mal olor que emana de los barnes.  · Tiene fiebre.  · Se abren lor o más de los barnes.  Solicite ayuda de inmediato  si:  · Tiene dificultad para respirar.  · Siente dolor en el pecho.  · Siente dolor que empeora en la anish de los hombros.  · Se desmaya o se siente mareado al ponerse de pie.  · Tiene dolor muy intenso de vientre (abdomen).  · Tiene malestar estomacal (náuseas) que dura más de un día.  · Vomita ketty más de un día.  · Siente dolor en la pierna.  Esta información no tiene karmen fin reemplazar el consejo del médico. Asegúrese de hacerle al médico cualquier pregunta que tenga.  Document Released: 08/29/2012 Document Revised: 03/26/2018 Document Reviewed: 06/05/2017  Elsevier Patient Education © 2020 Elsevier Inc.

## 2021-03-05 ENCOUNTER — PATIENT OUTREACH (OUTPATIENT)
Dept: HEALTH INFORMATION MANAGEMENT | Facility: OTHER | Age: 56
End: 2021-03-05

## 2021-03-05 NOTE — PROGRESS NOTES
Discharge instructions reviewed and questions answered. PIV removed and catheter intact. Pt to call and make f/u with Dr. Orozco.   [id number] utilized to review conversation. Pt wheeled to car and transported home.

## 2021-03-05 NOTE — PROGRESS NOTES
03/05/21    CHW Ruby and  called patient. Patient answered and then hung up the phone. CHW called back and patient did not answer. Patient does not have a voicemail.    03/09/21    CHW Ruby called patient with . Patient did not answer and does not have a voicemail. CCM will no longer follow as unable to contact.

## 2021-10-25 ENCOUNTER — HOSPITAL ENCOUNTER (OUTPATIENT)
Dept: LAB | Facility: MEDICAL CENTER | Age: 56
End: 2021-10-25
Attending: PHYSICIAN ASSISTANT
Payer: COMMERCIAL

## 2021-10-25 ENCOUNTER — HOSPITAL ENCOUNTER (OUTPATIENT)
Dept: RADIOLOGY | Facility: MEDICAL CENTER | Age: 56
End: 2021-10-25
Attending: PHYSICIAN ASSISTANT
Payer: COMMERCIAL

## 2021-10-25 DIAGNOSIS — K21.9 GASTROESOPHAGEAL REFLUX DISEASE, UNSPECIFIED WHETHER ESOPHAGITIS PRESENT: ICD-10-CM

## 2021-10-25 DIAGNOSIS — K80.30 CALCULUS OF BILE DUCT WITH CHOLANGITIS WITHOUT OBSTRUCTION, UNSPECIFIED CHOLANGITIS ACUITY: ICD-10-CM

## 2021-10-25 LAB
ALBUMIN SERPL BCP-MCNC: 3.8 G/DL (ref 3.2–4.9)
ALP SERPL-CCNC: 121 U/L (ref 30–99)
ALT SERPL-CCNC: 11 U/L (ref 2–50)
AST SERPL-CCNC: 16 U/L (ref 12–45)
BILIRUB CONJ SERPL-MCNC: <0.2 MG/DL (ref 0.1–0.5)
BILIRUB INDIRECT SERPL-MCNC: ABNORMAL MG/DL (ref 0–1)
BILIRUB SERPL-MCNC: 0.3 MG/DL (ref 0.1–1.5)
LIPASE SERPL-CCNC: 31 U/L (ref 11–82)
PROT SERPL-MCNC: 7 G/DL (ref 6–8.2)

## 2021-10-25 PROCEDURE — 74018 RADEX ABDOMEN 1 VIEW: CPT

## 2021-10-25 PROCEDURE — 83690 ASSAY OF LIPASE: CPT

## 2021-10-25 PROCEDURE — 36415 COLL VENOUS BLD VENIPUNCTURE: CPT

## 2021-10-25 PROCEDURE — 80076 HEPATIC FUNCTION PANEL: CPT

## (undated) DEVICE — PACK LAP CHOLE OR - (2EA/CA)

## (undated) DEVICE — SUTURE GENERAL

## (undated) DEVICE — TROCAR 5X100 NON BLADED Z-TH - READ KII (6/BX)

## (undated) DEVICE — TUBING CLEARLINK DUO-VENT - C-FLO (48EA/CA)

## (undated) DEVICE — MASK ANESTHESIA ADULT  - (100/CA)

## (undated) DEVICE — CANISTER SUCTION 3000ML MECHANICAL FILTER AUTO SHUTOFF MEDI-VAC NONSTERILE LF DISP  (40EA/CA)

## (undated) DEVICE — SET EXTENSION WITH 2 PORTS (48EA/CA) ***PART #2C8610 IS A SUBSTITUTE*****

## (undated) DEVICE — CHLORAPREP 26 ML APPLICATOR - ORANGE TINT(25/CA)

## (undated) DEVICE — SHEARS ENDO 5MM SHORT - (6EA/CA)

## (undated) DEVICE — HEAD HOLDER JUNIOR/ADULT

## (undated) DEVICE — FILM CASSETTE ENDO

## (undated) DEVICE — KIT ANESTHESIA W/CIRCUIT & 3/LT BAG W/FILTER (20EA/CA)

## (undated) DEVICE — SUTURE 0 VICRYL PLUS UR-6 - 27 INCH (36/BX)

## (undated) DEVICE — BAG RETRIEVAL 10ML (10EA/BX)

## (undated) DEVICE — SENSOR SPO2 NEO LNCS ADHESIVE (20/BX) SEE USER NOTES

## (undated) DEVICE — SODIUM CHL IRRIGATION 0.9% 1000ML (12EA/CA)

## (undated) DEVICE — NEEDLE INSFL 120MM 14GA VRRS - (20/BX)

## (undated) DEVICE — SPONGE GAUZE NON-STERILE 4X4 - (2000/CA 10PK/CA)

## (undated) DEVICE — SET TUBING PNEUMOCLEAR HIGH FLOW SMOKE EVACUATION (10EA/BX)

## (undated) DEVICE — CONTAINER, SPECIMEN, STERILE

## (undated) DEVICE — STAPLER 45MM ARTICULATING - ENDO (3EA/BX)

## (undated) DEVICE — STAPLE 45MM BLUE 4.5MM (12EA/BX)

## (undated) DEVICE — SYRINGE 10 ML CONTROL LL (25EA/BX 4BX/CA)

## (undated) DEVICE — TUBE CONNECT SUCTION CLEAR 120 X 1/4" (50EA/CA)"

## (undated) DEVICE — TUBING LAPAROSCOPIC PLUME DEVICE (10EA/CA)

## (undated) DEVICE — CLIP MED LG INTNL HRZN TI ESCP - (20/BX)

## (undated) DEVICE — LACTATED RINGERS INJ 1000 ML - (14EA/CA 60CA/PF)

## (undated) DEVICE — GOWN WARMING STANDARD FLEX - (30/CA)

## (undated) DEVICE — HEMOSTAT ARISTA PWD 3 GRAM - (5/CA)

## (undated) DEVICE — ELECTRODE DUAL RETURN W/ CORD - (50/PK)

## (undated) DEVICE — NEPTUNE 4 PORT MANIFOLD - (20/PK)

## (undated) DEVICE — EXTRACTOR PRO XL 9-12 MM ABOVE

## (undated) DEVICE — DERMABOND ADVANCED - (12EA/BX)

## (undated) DEVICE — TROCAR Z THREAD12MM OPTICAL - NON BLADED (6/BX)

## (undated) DEVICE — PROTECTOR ULNA NERVE - (36PR/CA)

## (undated) DEVICE — SET LEADWIRE 5 LEAD BEDSIDE DISPOSABLE ECG (1SET OF 5/EA)

## (undated) DEVICE — SUTURE 4-0 MONOCRYL PLUS PS-2 - 27 INCH (36/BX)

## (undated) DEVICE — BITE BLOCK ADULT 60FR (100EA/CA)

## (undated) DEVICE — TUBE SHILEY ENDOTRACHEAL ORAL RAE CUFFED 7.0MM WITH TAPERGUARD (10EA/PK)

## (undated) DEVICE — CANNULA W/SEAL 5X100 Z-THRE - ADED KII (12/BX)

## (undated) DEVICE — SCISSORS 5MM CVD (6EA/BX)

## (undated) DEVICE — ELECTRODE 850 FOAM ADHESIVE - HYDROGEL RADIOTRNSPRNT (50/PK)

## (undated) DEVICE — SUCTION INSTRUMENT YANKAUER BULBOUS TIP W/O VENT (50EA/CA)

## (undated) DEVICE — KIT CUSTOM PROCEDURE SINGLE FOR ENDO  (15/CA)

## (undated) DEVICE — GLOVE BIOGEL SZ 7.5 SURGICAL PF LTX - (50PR/BX 4BX/CA)

## (undated) DEVICE — SLEEVE, VASO, THIGH, MED